# Patient Record
Sex: FEMALE | Race: BLACK OR AFRICAN AMERICAN | Employment: OTHER | URBAN - METROPOLITAN AREA
[De-identification: names, ages, dates, MRNs, and addresses within clinical notes are randomized per-mention and may not be internally consistent; named-entity substitution may affect disease eponyms.]

---

## 2017-01-13 ENCOUNTER — HOSPITAL ENCOUNTER (OUTPATIENT)
Dept: BONE DENSITY | Age: 62
Discharge: HOME | End: 2017-01-13
Attending: OBSTETRICS & GYNECOLOGY
Payer: COMMERCIAL

## 2017-01-13 ENCOUNTER — HOSPITAL ENCOUNTER (OUTPATIENT)
Dept: MAMMOGRAPHY | Age: 62
Discharge: HOME | End: 2017-01-13
Attending: OBSTETRICS & GYNECOLOGY
Payer: COMMERCIAL

## 2017-01-13 DIAGNOSIS — Z78.0 ASYMPTOMATIC POSTMENOPAUSAL STATUS (AGE-RELATED) (NATURAL): ICD-10-CM

## 2017-01-13 DIAGNOSIS — Z12.31 VISIT FOR SCREENING MAMMOGRAM: ICD-10-CM

## 2017-01-13 PROCEDURE — 77067 SCR MAMMO BI INCL CAD: CPT

## 2017-01-13 PROCEDURE — 77080 DXA BONE DENSITY AXIAL: CPT

## 2017-11-15 ENCOUNTER — HOSPITAL ENCOUNTER (OUTPATIENT)
Dept: LAB | Age: 62
Discharge: HOME | End: 2017-11-15
Payer: COMMERCIAL

## 2017-11-15 DIAGNOSIS — I10 ESSENTIAL HYPERTENSION, MALIGNANT: ICD-10-CM

## 2017-11-15 PROCEDURE — 93005 ELECTROCARDIOGRAM TRACING: CPT

## 2017-11-16 LAB
ATRIAL RATE: 64 BPM
CALCULATED P AXIS, ECG09: 14 DEGREES
CALCULATED R AXIS, ECG10: 15 DEGREES
CALCULATED T AXIS, ECG11: 55 DEGREES
DIAGNOSIS, 93000: NORMAL
P-R INTERVAL, ECG05: 134 MS
Q-T INTERVAL, ECG07: 422 MS
QRS DURATION, ECG06: 84 MS
QTC CALCULATION (BEZET), ECG08: 435 MS
VENTRICULAR RATE, ECG03: 64 BPM

## 2018-02-01 ENCOUNTER — HOSPITAL ENCOUNTER (OUTPATIENT)
Dept: PHYSICAL THERAPY | Age: 63
Discharge: HOME | End: 2018-02-01
Payer: COMMERCIAL

## 2018-02-01 PROCEDURE — 97165 OT EVAL LOW COMPLEX 30 MIN: CPT

## 2018-02-01 PROCEDURE — 97110 THERAPEUTIC EXERCISES: CPT

## 2018-02-01 NOTE — PROGRESS NOTES
Hand Therapy Evaluation and Daily Note    Patient Name: Haylee Nurse  Date:2018  : 1955  Age: 65 y.o.y/o  [x]  Patient  Verified  Payor: BLUE CROSS / Plan: [id number] PPO / Product Type: PPO /    Referring Provider: Juanita Hancock*  Next MD Visit:    Onset Date:  17  Surgical Date: N/A  Surgical Procedure: N/A    In time:5:05  Out time:6:00  Total Treatment Time (min): 55  Visit #:  12    Treatment Area: Other extraarticular fracture of lower end of right radius, subsequent encounter for closed fracture with routine healing [S52.451T]    Precautions: none, WB as tolerated    Hand Dominance: right handed   Hand Involved: right    Total Evaluation Time:  30    History of Present Condition:  Patient is a 65 y.o. female with a chief complaint of R wrist pain and weakness s/p R closed fracture of distal radius. Pt reports a fall on 17 when going to sit down and seat moved, causing her to fall and break the R wrist. Pt reports pain is worse at night. Pt presented with wrist cock up splint and reports wearing it when performing lifting tasks. Pt reports she went to [facility] ED in which she received X-rays and staff reported no fracture. Pt referred to orthopedic in which he placed pt in wrist cock up splint and did not perform X rays at that time. Pt returned to MD 2 weeks later due to c/o increased pain and swelling. MD took X-rays at that time and X-rays resulted in a nondisplaced fracture and horizontal fracture of the right distal radius. Pt reports being casted up until 3 weeks ago and referred to skilled OT services to address deficits and improve quality of life. Pain Rating:   Current: (0-no pain 10-debilitating pain) mild   At best: (0-no pain 10-debilitating pain) mild and moderate  At worst: (0-no pain 10-debilitating pain) severe  Location: right wrist  Type:  moderate   Better with:    Worse with:     Medications/Allergies/Past Medical History:  See chart; reviewed with patient. H/x breast reduction, arthritis, high BP    Diagnostic Tests: X-rays on 11/18/17 at [facility] ED, second X-rays over 30 days later w/ ortho MD.    Prior Level of Function: Able to perform daily activities without functional limitations. Current Level of Function:  Unable to use R hand for functional tasks without pain. Social History: Pt reports living w/ spouse and reports living in single story home. Occupation/Job Requirements: Not working. Observation: mild swelling, pt presented w/ wrist cock up splint   Scar/incision:   N/A  Location:  R wrist     Palpation:  Mild tenderness reported w/ palpation to R distal radius. Range of Motion:   Elbow/Wrist   Wrist Right  2/1/18 Left  2/1/18      Flex 55 90      Ext 37 80      UD 20 30      RD 5 20      FA         Pro 80 80      Sup 65 80      Elbow        Flex 150       Ext 0 0          Strength:   Measurements: Taken with Devon Dynamometer, in Lbs   Level 2 Date  2/1/18 Date Date Date Date Date Date   Right 5         Left 50         Deficit 45         Change                Pinch Measurements: Taken with Pinch Gauge, in Lbs   (hand) Date  2/1/18 Date Date Date Date Date   Lateral          Right 6        Left  17        Deficit 9        Change         Pad         Right 3        Left 10        Deficit 7        Change         Paco         Right 4        Left 11        Deficit 7        Change             Sensation:    Constant numbing and tingling volar tip of thumb    Edema: GIRTH CHART measured in cm  Date: 2/1/18 2/1/18   Side R L   DPC circum.  19.6 18.8   Wrist Crease 16.2 15.9   FA  20 21   Elbow 22.2 23.4       Special Tests:    Nine-Hole Peg Test:  Left= _____seconds  Right=_____seconds      ADLs  Feeding:        []MaxA   []ModA   []Brandon   [] CGA   []SBA   []Katy   [x]Independent  UE Dressing:       []MaxA   []ModA   [x]Brandon   [] CGA   []SBA   []Katy   []Independent  LE Dressing:       []MaxA   []ModA   [x]Brandon   [] CGA []SBA   []Katy   []Independent  Grooming:       []MaxA   []ModA   []Brandon   [] CGA   []SBA   []Katy   [x]Independent  Toileting:       []MaxA   []ModA   []Brandon   [] CGA   []SBA   []Katy   [x]Independent  Bathing:       []MaxA   []ModA   []Brandon   [] CGA   []SBA   []Katy   [x]Independent  Light Meal Prep:    []MaxA   []ModA   [x]Brandon   [] CGA   []SBA   []Katy   []Independent  Household/Other: []MaxA   []ModA   [x]Brandon   [] CGA   []SBA   []Katy   []Independent  Adaptive Equip:     []MaxA   []ModA   []Brandon   [] CGA   []SBA   []Katy   []Independent  Driving:       []MaxA   []ModA   []Brandon   [] CGA   []SBA   []Katy   [x]Independent      Todays Treatment:  Patient received an initial evaluation today followed by education as to diagnosis, precautions and treatment plan. Patient was provided with a basic home exercise program including wrist AROM, wrist stretches, tendon glides, and radial nerve glides. OBJECTIVE     25 min Therapeutic Exercise:  [x] See flow sheet :   Rationale: increase ROM and increase strength to improve the patients ability to use R UE for functional tasks. With   [x] TE   [] TA   [] neuro   [] other: Patient Education: [x] Review HEP    [] Progressed/Changed HEP based on:   [] positioning   [] body mechanics   [] transfers   [] heat/ice application   [] Splint wear/care   [] Sensory re-education   [] scar management      [] other:      Pain Level (0-10 scale) post treatment: 4/10    Patient will continue to benefit from skilled OT services to modify and progress therapeutic interventions, address ROM deficits, address strength deficits and analyze and address soft tissue restrictions to attain goals. Assessment: Pt able close hand and make a fist and is able to perform opposition of thumb to all tips and bases of fingers with effort for ring and fifth fingers. Short Term Goals: To be accomplished in 2  weeks:  1.  Patient will be compliant with initial home exercise program to take an active role in their rehabilitation process. Status at Eval: instructed and provided initial HEP. 2. Patient will demonstrate a good understanding of their condition and strategies for self-management. Status at Eval: discussed increasing use of R UE as tolerated. Long Term Goals: To be accomplished in 4 weeks:   1. Patient will have 70 degrees of right wrist extension in order to increase ease with ADL's such as bathing, eating and dressing and to eventually bear weight thru the right upper extremity as in pushing up from a chair. Status at eval: 37  2. Patient will have 75 degrees of right wrist flexion in order to perform hygiene tasks and /or work with tools such as a screwdriver. Status at eval: 55  3. Patient will have 75 degrees of right forearm supination in order to perform ADL's including washing face and accepting change. Status at Eval: 65  4. Patient will have 50 pounds of  in the right hand to allow for functional grasp for all ADL activities including dressing, bathing and self care. At Eval: 5  5. Patient will have improved right lateral pinch strength of  15 pounds in order to perform fine motor tasks such as turning pages, turning ignition and open containers. At Eval: 6  6. Patient will report no difficulty carrying a shopping bag or briefcase on FOTO outcome measure in order to demonstrate improved functional performance. Status at Eval: Unable to do    Frequency / Duration: Patient to be seen 2-3 times per week for 4 weeks:    Patient/ Caregiver education and instruction: Diagnosis, prognosis, activity modification and exercises    Functional Status Measure:  Patient's: 27  FOTO Benchmark: 53  Expected Change: 26  FOTO score based on 0 - 100 point scale, with 100 being no impairment. These scores are determined by patient reported functional assessments compared against national benchmarked data.     Carry   Current  CL= 60-79%    Goal  CK= 40-59%    The severity rating is based on clinical judgment and the FOTO score.     Les Santana OT 2/1/2018 5:08 PM

## 2018-02-01 NOTE — PROGRESS NOTES
[facility]  [address]  [place], [address]  [phone] fax    Plan of Care/Statement of Necessity for Occupational Therapy Services    Patient name: Komal Benz Start of Care: 2018   Referral source: Dom Siddiqi* : 1955    Medical Diagnosis: Other extraarticular fracture of lower end of right radius, subsequent encounter for closed fracture with routine healing [S52.551D]   Onset Date:17    Treatment Diagnosis: R wrist pain   Prior Hospitalization: see medical history Provider#: [id number]   Medications: Verified on Patient summary List    Comorbidities: arthritis, high BP   Prior Level of Function: Able to perform daily activities without functional limitations. The Plan of Care and following information is based on the information from the initial evaluation. Assessment/ key information: Patient is a 65 y.o. female with a chief complaint of R wrist pain and weakness s/p R closed fracture of distal radius. Pt reports a fall on 17 when going to sit down and seat moved, causing her to fall and break the R wrist. Pt reports pain is worse at night. Pt presented with wrist cock up splint and reports wearing it when performing lifting tasks. Pt reports she went to [facility] ED in which she received X-rays and staff reported no fracture. Pt referred to orthopedic in which he placed pt in wrist cock up splint and did not perform X rays at that time. Pt returned to MD 2 weeks later due to c/o increased pain and swelling. MD took X-rays at that time and X-rays resulted in a nondisplaced fracture and horizontal fracture of the right distal radius. Pt reports being casted up until 3 weeks ago and presented today with wrist cockup splint. Skilled OT services are necessary to address deficits and improve quality of life.     Evaluation Complexity: History LOW Complexity : Brief history review  Examination LOW Complexity : 1-3 performance deficits relating to physical, cognitive , or psychosocial skils that result in activity limitations and / or participation restrictions  Clinical Decision Making LOW Complexity : No comorbidities that affect functional and no verbal or physical assistance needed to complete eval tasks   Overall Complexity Rating: LOW     Problem List: Pain effecting function, Decreased range of motion, Decreased strength, Edema effecting function and Decreased flexibility/joint mobility     Treatment Plan may include any combination of the following: Therapeutic exercise, Therapeutic activities, Neuromuscular re-education, Physical agent/modality, Manual therapy and Patient education    Patient / Family readiness to learn indicated by: asking questions and interest    Persons(s) to be included in education:   patient (P)    Barriers to Learning/Limitations: None    Patient Goal (s): To restore full use of hand and wrist.    Patient Self Reported Health Status: good    Rehabilitation Potential: good    Short Term Goals: To be accomplished in 2  weeks:  1. Patient will be compliant with initial home exercise program to take an active role in their rehabilitation process. Status at Eval: instructed and provided initial HEP. 2. Patient will demonstrate a good understanding of their condition and strategies for self-management. Status at Eval: discussed increasing use of R UE as tolerated.     Long Term Goals: To be accomplished in 4 weeks:                        1. Patient will have 70 degrees of right wrist extension in order to increase ease with ADL's such as bathing, eating and dressing and to eventually bear weight thru the right upper extremity as in pushing up from a chair. Status at eval: 37  2. Patient will have 75 degrees of right wrist flexion in order to perform hygiene tasks and /or work with tools such as a screwdriver. Status at eval: 55  3.  Patient will have 75 degrees of right forearm supination in order to perform ADL's including washing face and accepting change. Status at Eval: 65  4. Patient will have 50 pounds of  in the right hand to allow for functional grasp for all ADL activities including dressing, bathing and self care. At Eval: 5  5. Patient will have improved right lateral pinch strength of  15 pounds in order to perform fine motor tasks such as turning pages, turning ignition and open containers. At Eval: 6  6. Patient will report no difficulty carrying a shopping bag or briefcase on FOTO outcome measure in order to demonstrate improved functional performance. Status at Eval: Unable to do    Frequency / Duration: Patient to be seen 2-3 times per week for 4 weeks:     Patient/ Caregiver education and instruction: Diagnosis, prognosis, activity modification and exercises     Functional Status Measure:  Patient's: 27  FOTO Benchmark: 53  Expected Change: 26  FOTO score based on 0 - 100 point scale, with 100 being no impairment. These scores are determined by patient reported functional assessments compared against national benchmarked data.     Carry   Current  CL= 60-79%    Goal  CK= 40-59%     The severity rating is based on clinical judgment and the FOTO score. [x]  Plan of care has been reviewed with Bhargav Real OT 2/1/2018 5:08 PM  ________________________________________________________________________    I certify that the above Therapy Services are being furnished while the patient is under my care. I agree with the treatment plan and certify that this therapy is necessary.     Physician's Signature:____________________  Date:____________Time:__________    Please sign and return to [facility] [address]  [place], [address]  [phone] fax

## 2018-02-13 ENCOUNTER — APPOINTMENT (OUTPATIENT)
Dept: PHYSICAL THERAPY | Age: 63
End: 2018-02-13
Payer: COMMERCIAL

## 2018-02-15 ENCOUNTER — HOSPITAL ENCOUNTER (OUTPATIENT)
Dept: PHYSICAL THERAPY | Age: 63
Discharge: HOME | End: 2018-02-15
Payer: COMMERCIAL

## 2018-02-15 PROCEDURE — 97110 THERAPEUTIC EXERCISES: CPT

## 2018-02-15 PROCEDURE — 97035 APP MDLTY 1+ULTRASOUND EA 15: CPT

## 2018-02-15 NOTE — PROGRESS NOTES
OT DAILY TREATMENT NOTE - non [facility] 3-16    Patient Name: Haylee Nurse  Date:2/15/2018  : 1955  [x]  Patient  Verified  Payor: BLUE CROSS / Plan: [facility] PPO / Product Type: PPO /    In time: 5:30  Out time: 6:  Total Treatment Time (min): 45  Visit #: 2 of 12    Treatment Area: Other extraarticular fracture of lower end of right radius, subsequent encounter for closed fracture with routine healing [N09.489H]    SUBJECTIVE  Pain Level (0-10 scale): 6/10  Any medication changes, allergies to medications, adverse drug reactions, diagnosis change, or new procedure performed?: [x] No    [] Yes (see summary sheet for update)  Subjective functional status/changes:   [] No changes reported  \"I'm beginning to be able to use my hand more. \"    OBJECTIVE  Modality rationale: decrease edema, decrease pain and increase tissue extensibility to improve the patients ability to increase use of R hand to complete functional tasks without pain.    Min Type Additional Details    [] Estim:  []Unatt       []IFC  []Premod                        []Other:  []w/ice   []w/heat  Position:  Location:    [] Estim: []Att    []TENS instruct  []NMES                    []Other:  []w/US   []w/ice   []w/heat  Position:  Location:    []  Traction: [] Cervical       []Lumbar                       [] Prone          []Supine                       []Intermittent   []Continuous Lbs:  [] before manual  [] after manual   10 [x]  Ultrasound: [x]Continuous   [] Pulsed                           []1MHz   [x]3MHz Location: R dorsal hand  W/cm2: 1.0    []  Iontophoresis with dexamethasone         Location: [] Take home patch   [] In clinic   10 []  Ice     [x]  heat  []  Ice massage  []  Laser   []  Anodyne Position:   Location: R hand    []  Laser with stim  []  Other: Position:  Location:    []  Vasopneumatic Device Pressure:       [] lo [] med [] hi   Temperature: [] lo [] med [] hi   [x] Skin assessment post-treatment: [x]intact []redness- no adverse reaction    []redness - adverse reaction:   25 min Therapeutic Exercise:  [] See flow sheet :   Rationale: increase ROM and increase strength to improve the patients ability to use R hand for lifting/carrying items. With   [x] TE   [] TA   [] neuro   [] other: Patient Education: [x] Review HEP    [] Progressed/Changed HEP based on:   [] positioning   [] body mechanics   [] transfers   [] heat/ice application   [] Splint wear/care   [] Sensory re-education   [] scar management      [] other:             Other Objective/Functional Measures: Range of Motion:   Elbow/Wrist   Wrist Right  2/1/18 Left  2/1/18         Flex 55 90         Ext 37 80         UD 20 30         RD 5 20         FA              Pro 80 80         Sup 65 80         Elbow             Flex 150           Ext 0 0               Strength:   Measurements: Taken with Devon Dynamometer, in Lbs   Level 2 Date  2/1/18 Date Date Date Date Date Date   Right 5               Left 50               Deficit 45               Change                      Pinch Measurements: Taken with Pinch Gauge, in Lbs   (hand) Date  2/1/18 Date Date Date Date Date   Lateral                Right 6             Left  17             Deficit 9             Change               Pad               Right 3             Left 10             Deficit 7             Change               Paco               Right 4             Left 11             Deficit 7             Change                     Sensation:    Constant numbing and tingling volar tip of thumb     Edema: GIRTH CHART measured in cm  Date: 2/1/18 2/1/18   Side R L   DPC circum. 19.6 18.8   Wrist Crease 16.2 15.9   FA  20 21   Elbow 22.2 23.4        Pain Level (0-10 scale) post treatment: 5/10    ASSESSMENT/Changes in Function: Pt challenged today with therapeutic exercises. Pt reported decreased pain after US. Pt has met STGs.     Patient will continue to benefit from skilled OT services to modify and progress therapeutic interventions, address ROM deficits and address strength deficits to attain remaining goals. [x]  See Plan of Care  []  See progress note/recertification  []  See Discharge Summary         Progress towards goals / Updated goals:  Short Term Goals: To be accomplished in 2  weeks:  1. Patient will be compliant with initial home exercise program to take an active role in their rehabilitation process. Goal Met 2/15/18  Status at Eval: instructed and provided initial HEP. 2. Patient will demonstrate a good understanding of their condition and strategies for self-management. Goal Met 2/15/18  Status at Eval: discussed increasing use of R UE as tolerated.     Long Term Goals: To be accomplished in 4 weeks:                        1. Patient will have 70 degrees of right wrist extension in order to increase ease with ADL's such as bathing, eating and dressing and to eventually bear weight thru the right upper extremity as in pushing up from a chair. Status at eval: 37  2. Patient will have 75 degrees of right wrist flexion in order to perform hygiene tasks and /or work with tools such as a screwdriver. Status at eval: 55  3. Patient will have 75 degrees of right forearm supination in order to perform ADL's including washing face and accepting change. Status at Eval: 65  4. Patient will have 50 pounds of  in the right hand to allow for functional grasp for all ADL activities including dressing, bathing and self care. At Eval: 5  5. Patient will have improved right lateral pinch strength of  15 pounds in order to perform fine motor tasks such as turning pages, turning ignition and open containers. At Eval: 6  6. Patient will report no difficulty carrying a shopping bag or briefcase on FOTO outcome measure in order to demonstrate improved functional performance.   Status at Eval: Unable to do    PLAN  []  Upgrade activities as tolerated     [x]  Continue plan of care  []  Update interventions per flow sheet       []  Discharge due to:_  []  Other:_      Birthkaren Leigh, OT 2/15/2018  5:51 PM    Future Appointments  Date Time Provider Bandar Jacobsen   2/16/2018 5:00 PM Aniceto Leigh, OT [facility]   2/21/2018 5:00 PM Brisa Covarrubias [facility]   2/22/2018 6:00 PM Tamara Johnston, OT [facility]   2/27/2018 5:00 PM Birthkaren Leigh, OT [facility]   3/1/2018 5:00 PM Birthkaren Leigh, OT [facility]   3/6/2018 6:00 PM Birtha Reese, OT [facility]

## 2018-02-16 ENCOUNTER — HOSPITAL ENCOUNTER (OUTPATIENT)
Dept: PHYSICAL THERAPY | Age: 63
Discharge: HOME | End: 2018-02-16
Payer: COMMERCIAL

## 2018-02-16 PROCEDURE — 97110 THERAPEUTIC EXERCISES: CPT

## 2018-02-16 PROCEDURE — 97035 APP MDLTY 1+ULTRASOUND EA 15: CPT

## 2018-02-16 NOTE — PROGRESS NOTES
OT DAILY TREATMENT NOTE - non [facility] 3-16    Patient Name: Bobbi Merino  Date:2018  : 1955  [x]  Patient  Verified  Payor: Stacey Evans / Plan: Tomeka Mtz [id number] PPO / Product Type: PPO /    In time: 5:00  Out time: 5:35  Total Treatment Time (min): 35  Visit #: 3 of 12    Treatment Area: Other extraarticular fracture of lower end of right radius, subsequent encounter for closed fracture with routine healing [I37.867C]    SUBJECTIVE  Pain Level (0-10 scale): 6/10  Any medication changes, allergies to medications, adverse drug reactions, diagnosis change, or new procedure performed?: [x] No    [] Yes (see summary sheet for update)  Subjective functional status/changes:   [] No changes reported  \"I've been in pain most of the day today. \"    OBJECTIVE  Modality rationale: decrease pain and increase tissue extensibility to improve the patients ability to use R hand for functional tasks.    Min Type Additional Details    [] Estim:  []Unatt       []IFC  []Premod                        []Other:  []w/ice   []w/heat  Position:  Location:    [] Estim: []Att    []TENS instruct  []NMES                    []Other:  []w/US   []w/ice   []w/heat  Position:  Location:    []  Traction: [] Cervical       []Lumbar                       [] Prone          []Supine                       []Intermittent   []Continuous Lbs:  [] before manual  [] after manual   10 [x]  Ultrasound: [x]Continuous   [] Pulsed                           []1MHz   [x]3MHz Location: R dorsal wrist  W/cm2: 1.0    []  Iontophoresis with dexamethasone         Location: [] Take home patch   [] In clinic   10 []  Ice     [x]  heat  []  Ice massage  []  Laser   []  Anodyne Position:  Location: R hand    []  Laser with stim  []  Other: Position:  Location:    []  Vasopneumatic Device Pressure:       [] lo [] med [] hi   Temperature: [] lo [] med [] hi   [x] Skin assessment post-treatment:  [x]intact []redness- no adverse reaction    []redness - adverse reaction:     15 min Therapeutic Exercise:  [x] See flow sheet :   Rationale: increase ROM to improve the patients ability to move R wrist thru pain-free ROM. With   [] TE   [] TA   [] neuro   [] other: Patient Education: [x] Review HEP    [] Progressed/Changed HEP based on:   [] positioning   [] body mechanics   [] transfers   [] heat/ice application   [] Splint wear/care   [] Sensory re-education   [] scar management      [] other:             Other Objective/Functional Measures: Range of Motion:   Elbow/Wrist   Wrist Right  2/1/18 Left  2/1/18            Flex 55 90            Ext 37 80            UD 20 30            RD 5 20            FA                   Pro 80 80            Sup 65 80            Elbow                  Flex 150               Ext 0 0                    Strength:   Measurements: Taken with Devon Dynamometer, in Lbs   Level 2 Date  2/1/18 Date Date Date Date Date Date   Right 5                     Left 50                     Deficit 45                     Change                             Pinch Measurements: Taken with Pinch Gauge, in Lbs   (hand) Date  2/1/18 Date Date Date Date Date   Lateral                      Right 6                  Left  17                  Deficit 9                  Change                     Pad                     Right 3                  Left 10                  Deficit 7                  Change                     Paco                     Right 4                  Left 11                  Deficit 7                  Change                             Sensation:    Constant numbing and tingling volar tip of thumb      Edema: GIRTH CHART measured in cm  Date: 2/1/18 2/1/18   Side R L   DPC circum. 19.6 18.8   Wrist Crease 16.2 15.9   FA  20 21   Elbow 22.2 23.4     Pain Level (0-10 scale) post treatment: 6/10    ASSESSMENT/Changes in Function: Pt tolerated gentle strengthening ex's after MHP.  Instructed pt to increase use of R hand/wrist within pain tolerance. Patient will continue to benefit from skilled OT services to modify and progress therapeutic interventions, address ROM deficits, address strength deficits and analyze and address soft tissue restrictions to attain remaining goals. [x]  See Plan of Care  []  See progress note/recertification  []  See Discharge Summary         Progress towards goals / Updated goals:  Short Term Goals: To be accomplished in 2  weeks:  1. Patient will be compliant with initial home exercise program to take an active role in their rehabilitation process. Goal Met 2/15/18  Status at Eval: instructed and provided initial HEP. 2. Patient will demonstrate a good understanding of their condition and strategies for self-management. Goal Met 2/15/18  Status at Eval: discussed increasing use of R UE as tolerated.      Long Term Goals: To be accomplished in 4 weeks:                        1. Patient will have 70 degrees of right wrist extension in order to increase ease with ADL's such as bathing, eating and dressing and to eventually bear weight thru the right upper extremity as in pushing up from a chair. Status at eval: 37  2.  Patient will have 75 degrees of right wrist flexion in order to perform hygiene tasks and /or work with tools such as a screwdriver. Status at eval: 55  3. Patient will have 75 degrees of right forearm supination in order to perform ADL's including washing face and accepting change. Status at Eval: 65  4. Patient will have 50 pounds of  in the right hand to allow for functional grasp for all ADL activities including dressing, bathing and self care. At Eval: 5  5. Patient will have improved right lateral pinch strength of  15 pounds in order to perform fine motor tasks such as turning pages, turning ignition and open containers. At Eval: 6  6.  Patient will report no difficulty carrying a shopping bag or briefcase on FOTO outcome measure in order to demonstrate improved functional performance.   Status at Eval: Unable to do    PLAN  []  Upgrade activities as tolerated     [x]  Continue plan of care  []  Update interventions per flow sheet       []  Discharge due to:_  []  Other:_      Otilia Adam OT 2/16/2018  5:08 PM    Future Appointments  Date Time Provider Bandar Jacobsen   2/21/2018 5:00 PM Miah Floyd [facility]   2/22/2018 6:00 PM Alicia Albarado OT [facility]   2/28/2018 2:30 PM Otilia Adam OT [facility]   3/1/2018 5:00 PM Otilia Adam OT [facility]   3/7/2018 1:00 PM Alicia Albarado OT [facility]

## 2018-02-21 ENCOUNTER — HOSPITAL ENCOUNTER (OUTPATIENT)
Dept: PHYSICAL THERAPY | Age: 63
Discharge: HOME | End: 2018-02-21
Payer: COMMERCIAL

## 2018-02-21 PROCEDURE — 97110 THERAPEUTIC EXERCISES: CPT

## 2018-02-21 PROCEDURE — 97035 APP MDLTY 1+ULTRASOUND EA 15: CPT

## 2018-02-21 NOTE — PROGRESS NOTES
OT DAILY TREATMENT NOTE - non [facility] 3-16    Patient Name: Brendan Platt  Date:2018  : 1955  [x]  Patient  Verified  Payor: BLUE CROSS / Plan: [facility] PPO / Product Type: PPO /    In time:500  Out time:537  Total Treatment Time (min): 37  Visit #: 4 of 12    Treatment Area:  Other extraarticular fracture of lower end of right radius, subsequent encounter for closed fracture with routine healing [F32.270K]    SUBJECTIVE  Pain Level (0-10 scale): 5/10  Any medication changes, allergies to medications, adverse drug reactions, diagnosis change, or new procedure performed?: [x] No    [] Yes (see summary sheet for update)  Subjective functional status/changes:   [] No changes reported  Pt reports decreased sensation in R Thumb    OBJECTIVE  Modality rationale: decrease inflammation, decrease pain and increase tissue extensibility to improve the patients ability to , pinch   Min Type Additional Details    [] Estim:  []Unatt       []IFC  []Premod                        []Other:  []w/ice   []w/heat  Position:  Location:    [] Estim: []Att    []TENS instruct  []NMES                    []Other:  []w/US   []w/ice   []w/heat  Position:  Location:    []  Traction: [] Cervical       []Lumbar                       [] Prone          []Supine                       []Intermittent   []Continuous Lbs:  [] before manual  [] after manual   10 [x]  Ultrasound: []Continuous   [x] Pulsed                           []1MHz   []3MHz Location: R Wrist  W/cm2: 1.0    []  Iontophoresis with dexamethasone         Location: [] Take home patch   [] In clinic   10 []  Ice     [x]  heat  []  Ice massage  []  Laser   []  Anodyne Position:  Location: R Wrist    []  Laser with stim  []  Other: Position:  Location:    []  Vasopneumatic Device Pressure:       [] lo [] med [] hi   Temperature: [] lo [] med [] hi   [x] Skin assessment post-treatment:  [x]intact []redness- no adverse reaction    []redness - adverse reaction:     17 min Therapeutic Exercise:  [] See flow sheet :   Rationale: increase ROM and increase strength to improve the patients ability to functionally use R Hand    Ball Stretches  Wrist Maze        With   [] TE   [] TA   [] neuro   [] other: Patient Education: [x] Review HEP    [] Progressed/Changed HEP based on:   [] positioning   [] body mechanics   [] transfers   [] heat/ice application   [] Splint wear/care   [] Sensory re-education   [] scar management      [] other:             Other Objective/Functional Measures:   Range of Motion:   Elbow/Wrist   Wrist Right  2/1/18 Left  2/1/18            Flex 55 90            Ext 37 80            UD 20 30            RD 5 20            FA                   Pro 80 80            Sup 65 80            Elbow                  Flex 150               Ext 0 0                    Strength:   Measurements: Taken with Devon Dynamometer, in Lbs   Level 2 Date  2/1/18 Date Date Date Date Date Date   Right 5                     Left 50                     Deficit 45                     Change                             Pinch Measurements: Taken with Pinch Gauge, in Lbs   (hand) Date  2/1/18 Date Date Date Date Date   Lateral                      Right 6                  Left  17                  Deficit 9                  Change                     Pad                     Right 3                  Left 10                  Deficit 7                  Change                     Paco                     Right 4                  Left 11                  Deficit 7                  Change                             Sensation:    Constant numbing and tingling volar tip of thumb      Edema: GIRTH CHART measured in cm  Date: 2/1/18 2/1/18   Side R L   DPC circum.  19.6 18.8   Wrist Crease 16.2 15.9   FA  20 21   Elbow 22.2 23.4          Pain Level (0-10 scale) post treatment: 2/10    ASSESSMENT/Changes in Function: Decreased pain with modalities    Patient will continue to benefit from skilled OT services to modify and progress therapeutic interventions, address ROM deficits, address strength deficits, analyze and address soft tissue restrictions and instruct in home and community integration to attain remaining goals. []  See Plan of Care  []  See progress note/recertification  []  See Discharge Summary         Progress towards goals / Updated goals:  Short Term Goals: To be accomplished in 2  weeks:  1. Patient will be compliant with initial home exercise program to take an active role in their rehabilitation process. Goal Met 2/15/18  Status at Eval: instructed and provided initial HEP. 2. Patient will demonstrate a good understanding of their condition and strategies for self-management. Goal Met 2/15/18  Status at Eval: discussed increasing use of R UE as tolerated.      Long Term Goals: To be accomplished in 4 weeks:                        1. Patient will have 70 degrees of right wrist extension in order to increase ease with ADL's such as bathing, eating and dressing and to eventually bear weight thru the right upper extremity as in pushing up from a chair. Status at eval: 37  2.  Patient will have 75 degrees of right wrist flexion in order to perform hygiene tasks and /or work with tools such as a screwdriver. Status at eval: 55  3. Patient will have 75 degrees of right forearm supination in order to perform ADL's including washing face and accepting change. Status at Eval: 65  4. Patient will have 50 pounds of  in the right hand to allow for functional grasp for all ADL activities including dressing, bathing and self care. At Eval: 5  5. Patient will have improved right lateral pinch strength of  15 pounds in order to perform fine motor tasks such as turning pages, turning ignition and open containers. At Eval: 6  6.  Patient will report no difficulty carrying a shopping bag or briefcase on FOTO outcome measure in order to demonstrate improved functional performance.   Status at Eval: Unable to do    PLAN  []  Upgrade activities as tolerated     [x]  Continue plan of care  []  Update interventions per flow sheet       []  Discharge due to:_  []  Other:_      Yogi Casey, ANTHONY/L 2/21/2018  5:35 PM    Future Appointments  Date Time Provider Bandar Jacobsen   2/22/2018 6:00 PM Zunilda Sanches, OT [facility]   2/28/2018 2:30 PM Chin Plata, OT [facility]   3/1/2018 5:00 PM Chin Plata, OT [facility]   3/7/2018 1:00 PM Keri Siemens, OT [facility]

## 2018-02-22 ENCOUNTER — APPOINTMENT (OUTPATIENT)
Dept: PHYSICAL THERAPY | Age: 63
End: 2018-02-22
Payer: COMMERCIAL

## 2018-02-27 ENCOUNTER — APPOINTMENT (OUTPATIENT)
Dept: PHYSICAL THERAPY | Age: 63
End: 2018-02-27
Payer: COMMERCIAL

## 2018-02-28 ENCOUNTER — HOSPITAL ENCOUNTER (OUTPATIENT)
Dept: PHYSICAL THERAPY | Age: 63
Discharge: HOME | End: 2018-02-28
Payer: COMMERCIAL

## 2018-02-28 PROCEDURE — 97035 APP MDLTY 1+ULTRASOUND EA 15: CPT

## 2018-02-28 PROCEDURE — 97110 THERAPEUTIC EXERCISES: CPT

## 2018-02-28 NOTE — PROGRESS NOTES
OT DAILY TREATMENT NOTE - non [facility] 3-16    Patient Name: Delfina Olsen  Date:2018  : 1955  [x]  Patient  Verified  Payor: BLUE CROSS / Plan: [facility] PPO / Product Type: PPO /    In time: 2:30  Out time: 3:10  Total Treatment Time (min): 40  Visit #: 5 of 12    Treatment Area: Other extraarticular fracture of lower end of right radius, subsequent encounter for closed fracture with routine healing [U20.953H]    SUBJECTIVE  Pain Level (0-10 scale): 4/10  Any medication changes, allergies to medications, adverse drug reactions, diagnosis change, or new procedure performed?: [x] No    [] Yes (see summary sheet for update)  Subjective functional status/changes:   [] No changes reported  \"I am feeling a little bit better I think. \"    OBJECTIVE  Modality rationale: decrease edema, decrease pain and increase tissue extensibility to improve the patients ability to use R hand for functional tasks.    Min Type Additional Details    [] Estim:  []Unatt       []IFC  []Premod                        []Other:  []w/ice   []w/heat  Position:  Location:    [] Estim: []Att    []TENS instruct  []NMES                    []Other:  []w/US   []w/ice   []w/heat  Position:  Location:    []  Traction: [] Cervical       []Lumbar                       [] Prone          []Supine                       []Intermittent   []Continuous Lbs:  [] before manual  [] after manual   10 [x]  Ultrasound: []Continuous   [x] Pulsed 50%                           []1MHz   [x]3MHz Location: dorsum R hand, dorsum R wrist  W/cm2: 1.0    []  Iontophoresis with dexamethasone         Location: [] Take home patch   [] In clinic   10 []  Ice     [x]  heat  []  Ice massage  []  Laser   []  Anodyne Position:  Location: R hand    []  Laser with stim  []  Other: Position:  Location:    []  Vasopneumatic Device Pressure:       [] lo [] med [] hi   Temperature: [] lo [] med [] hi   [x] Skin assessment post-treatment:  [x]intact []redness- no adverse reaction    []redness - adverse reaction:     20 min Therapeutic Exercise:  [x] See flow sheet :   Rationale: increase ROM and increase strength to improve the patients ability to increase use of R hand for functional tasks. With   [x] TE   [] TA   [] neuro   [] other: Patient Education: [x] Review HEP    [] Progressed/Changed HEP based on:   [] positioning   [] body mechanics   [] transfers   [] heat/ice application   [] Splint wear/care   [] Sensory re-education   [] scar management      [] other:             Other Objective/Functional Measures: Range of Motion:   Elbow/Wrist   Wrist Right  2/1/18 Left  2/1/18            Flex 55 90            Ext 37 80            UD 20 30            RD 5 20            FA                   Pro 80 80            Sup 65 80            Elbow                  Flex 150               Ext 0 0                    Strength:   Measurements: Taken with Devon Dynamometer, in Lbs   Level 2 Date  2/1/18 Date Date Date Date Date Date   Right 5                     Left 50                     Deficit 45                     Change                             Pinch Measurements: Taken with Pinch Gauge, in Lbs   (hand) Date  2/1/18 Date Date Date Date Date   Lateral                      Right 6                  Left  17                  Deficit 9                  Change                     Pad                     Right 3                  Left 10                  Deficit 7                  Change                     Paco                     Right 4                  Left 11                  Deficit 7                  Change                             Sensation:    Constant numbing and tingling volar tip of thumb      Edema: GIRTH CHART measured in cm  Date: 2/1/18 2/1/18   Side R L   DPC circum.  19.6 18.8   Wrist Crease 16.2 15.9   FA  20 21   Elbow 22.2 23.4        Pain Level (0-10 scale) post treatment: 4/10    ASSESSMENT/Changes in Function: Pt reported radiating pain into elbow after T-bar ex's, ceased activity and began US. Pt responded well to US. Patient will continue to benefit from skilled OT services to modify and progress therapeutic interventions, address ROM deficits, address strength deficits and analyze and address soft tissue restrictions to attain remaining goals. [x]  See Plan of Care  []  See progress note/recertification  []  See Discharge Summary         Progress towards goals / Updated goals:  Short Term Goals: To be accomplished in 2  weeks:  1. Patient will be compliant with initial home exercise program to take an active role in their rehabilitation process. Goal Met 2/15/18  Status at Eval: instructed and provided initial HEP. 2. Patient will demonstrate a good understanding of their condition and strategies for self-management. Goal Met 2/15/18  Status at Eval: discussed increasing use of R UE as tolerated.      Long Term Goals: To be accomplished in 4 weeks:                        1. Patient will have 70 degrees of right wrist extension in order to increase ease with ADL's such as bathing, eating and dressing and to eventually bear weight thru the right upper extremity as in pushing up from a chair. Status at eval: 37  2.  Patient will have 75 degrees of right wrist flexion in order to perform hygiene tasks and /or work with tools such as a screwdriver. Status at eval: 55  3. Patient will have 75 degrees of right forearm supination in order to perform ADL's including washing face and accepting change. Status at Eval: 65  4. Patient will have 50 pounds of  in the right hand to allow for functional grasp for all ADL activities including dressing, bathing and self care. At Eval: 5  5. Patient will have improved right lateral pinch strength of  15 pounds in order to perform fine motor tasks such as turning pages, turning ignition and open containers. At Eval: 6  6.  Patient will report no difficulty carrying a shopping bag or briefcase on FOTO outcome measure in order to demonstrate improved functional performance.   Status at Eval: Unable to do    PLAN  []  Upgrade activities as tolerated     [x]  Continue plan of care  []  Update interventions per flow sheet       []  Discharge due to:_  []  Other:_      Panchito Antonio OT 2/28/2018  2:29 PM    Future Appointments  Date Time Provider Bandar Jacobsen   2/28/2018 2:30 PM Panchito Antonio OT [facility]   3/1/2018 5:00 PM [facility]   3/7/2018 1:00 PM [facility]

## 2018-03-06 ENCOUNTER — APPOINTMENT (OUTPATIENT)
Dept: PHYSICAL THERAPY | Age: 63
End: 2018-03-06
Payer: COMMERCIAL

## 2018-03-07 ENCOUNTER — APPOINTMENT (OUTPATIENT)
Dept: PHYSICAL THERAPY | Age: 63
End: 2018-03-07
Payer: COMMERCIAL

## 2018-03-09 ENCOUNTER — HOSPITAL ENCOUNTER (OUTPATIENT)
Dept: PHYSICAL THERAPY | Age: 63
Discharge: HOME | End: 2018-03-09
Payer: COMMERCIAL

## 2018-03-09 PROCEDURE — 97110 THERAPEUTIC EXERCISES: CPT

## 2018-03-09 PROCEDURE — 97035 APP MDLTY 1+ULTRASOUND EA 15: CPT

## 2018-03-09 NOTE — PROGRESS NOTES
[facility]  [address] [facility], [facility] [address]  [phone] fax    Occupational Therapy Progress Note  Patient name: Juanita Oppenheim Start of Care:18   Referral source: Bo Simms* : 1955   Medical/Treatment Diagnosis: Other extraarticular fracture of lower end of right radius, subsequent encounter for closed fracture with routine healing [S52.609B] Onset Date:17     Prior Hospitalization: see medical history Provider#: [id number]   Medications: Verified on Patient Summary List    Comorbidities: arthritis, high BP   Prior Level of Function: Able to perform daily activities without functional limitations. Visits from Start of Care: 6   Missed Visits: 0    Established Goals:         Excellent           Good         Limited           None  [x] Increased ROM   []  []  [x]  []  [x] Increased Strength  []  []  [x]  []  [] Increased Mobility  []  []  []  []   [x] Decreased Pain   []  []  [x]  []  [] Decreased Swelling  []  []  []  []  [] Increased Fine Motor Skills []  []  []  []  [] Increased ADL [place] []  []  []  []    Key Functional Changes: Range of Motion:   Elbow/Wrist   Wrist Right  18 Left  2/1/18     3/9/18          Flex 55 90 54           Ext 37 80   54         UD 20 30   28         RD 5 20   15         FA                   Pro 80 80            Sup 65 80            Elbow                  Flex 150               Ext 0 0                    Strength:   Measurements: Taken with Devon Dynamometer, in Lbs   Level 2 Date  18 Date  3/9/18 Date Date Date Date Date   Right 5   10                  Left 50   37                  Deficit 45                     [place]                 Pinch Measurements: Taken with Pinch Gauge, in Lbs   (hand) Date  18 Date  3/9/18 Date Date Date Date   Lateral                      Right 6   7               Left  17   16               Deficit 9                  Change                     Pad                     Right 3   4               Left 10   10               Deficit 7                  Change                     Paco                     Right 4   5               Left 11   10               Deficit 7                  Change                             Sensation:    Constant numbing and tingling volar tip of thumb      Edema: GIRTH CHART measured in cm  Date: 2/1/18 2/1/18   Side R L   DPC circum. 19.6 18.8   Wrist Crease 16.2 15.9   FA  20 21   Elbow 22.2 23.4        Updated Goals: to be achieved in 4 weeks:  1. Patient will have 70 degrees of right wrist extension in order to increase ease with ADL's such as bathing, eating and dressing and to eventually bear weight thru the right upper extremity as in pushing up from a chair. Status at eval: 37  54- 3/9/18  2.  Patient will have 75 degrees of right wrist flexion in order to perform hygiene tasks and /or work with tools such as a screwdriver. Status at eval: 55  54-3/9/18  3. Patient will have 50 pounds of  in the right hand to allow for functional grasp for all ADL activities including dressing, bathing and self care. At Eval: 5  10# 3/8/18  4. Patient will have improved right lateral pinch strength of  15 pounds in order to perform fine motor tasks such as turning pages, turning ignition and open containers. At Eval: 6  7# 3/8/18  5. Patient will report no difficulty carrying a shopping bag or briefcase on FOTO outcome measure in order to demonstrate improved functional performance.   Status at Eval: Unable to do*    ASSESSMENT/RECOMMENDATIONS:  [x]Continue therapy per initial plan/protocol at a frequency of 2 x per week for 4 weeks  []Continue therapy with the following recommended changes:_____________________      _____________________________________________________________________  []Discontinue therapy progressing towards or have reached established goals  []Discontinue therapy due to lack of appreciable progress towards goals  []Discontinue therapy due to lack of attendance or compliance  []Await Physician's recommendations/decisions regarding therapy  []Other:________________________________________________________________    Thank you for this referral.   Radha Ware OT 3/9/2018 10:14 AM  NOTE TO PHYSICIAN:  [address] TO [facility]: [phone]  If you are unable to process this request in 24 hours please contact our office: [phone]    ? I have read the above report and request that my patient continue as recommended. ? I have read the above report and request that my patient continue therapy with the following changes/special instructions:__________________________________________________________  ? I have read the above report and request that my patient be discharged from therapy.     Physicians signature: ____________________________Date: ______Time:________

## 2018-03-09 NOTE — PROGRESS NOTES
OT DAILY TREATMENT NOTE - [facility]     Patient Name: Sina Castillo  Date:3/9/2018  : 1955  [x]  Patient  Verified  Payor: BLUE CROSS / Plan: Tomeka Mtz [id number] PPO / Product Type: PPO /    In time:810  Out time:910  Total Treatment Time (min): 60  Visit #: 6 of 8  Treatment Area: Other extraarticular fracture of lower end of right radius, subsequent encounter for closed fracture with routine healing [J06.623P]    SUBJECTIVE  Pain Level (0-10 scale): 4/10  Any medication changes, allergies to medications, adverse drug reactions, diagnosis change, or new procedure performed?: [x] No    [] Yes (see summary sheet for update)  Subjective functional status/changes:   [] No changes reported  \"My pain is a constant aching down the middle of the back of my hand. \"    OBJECTIVE    Modality rationale: decrease pain and increase tissue extensibility to improve the patients ability to move and use the right dominant UE.    Min Type Additional Details    [] Estim:  []Unatt       []IFC  []Premod                        []Other:  []w/ice   []w/heat  Position:  Location:    [] Estim: []Att    []TENS instruct  []NMES                    []Other:  []w/US   []w/ice   []w/heat  Position:  Location:    []  Traction: [] Cervical       []Lumbar                       [] Prone          []Supine                       []Intermittent   []Continuous Lbs:  [] before manual  [] after manual   10 [x]  Ultrasound: []Continuous   [x] Pulsed                           []1MHz   [x]3MHz W/cm2:1.0  Location: right wrist dorsal and volar    []  Iontophoresis with dexamethasone         Location: [] Take home patch   [] In clinic   15 []  Ice     [x]  heat  []  Ice massage  []  Laser   []  Anodyne Position:resting  Location: right hand    []  Laser with stim  []  Other:  Position:  Location:    []  Vasopneumatic Device Pressure:       [] lo [] med [] hi   Temperature: [] lo [] med [] hi   [x] Skin assessment post-treatment:  [x]intact [x]redness- no adverse reaction    []redness - adverse reaction:     35 min Therapeutic Exercise:  [] See flow sheet :   Rationale: increase ROM and increase strength to improve the patients ability to return to functional use of the right wrist.      With   [] TE   [] TA   [] neuro   [] other: Patient Education: [x] Review HEP    [] Progressed/Changed HEP based on:   [] positioning   [] body mechanics   [] transfers   [] heat/ice application   [] Splint wear/care   [] Sensory re-education   [] scar management      [] other:            Other Objective/Functional Measures: Range of Motion:   Elbow/Wrist   Wrist Right  2/1/18 Left  2/1/18    3/9/18          Flex 55 90 54           Ext 37 80   54         UD 20 30   28         RD 5 20   15         FA                   Pro 80 80            Sup 65 80            Elbow                  Flex 150               Ext 0 0                    Strength:   Measurements: Taken with Devon Dynamometer, in Lbs   Level 2 Date  2/1/18 Date  3/9/18 Date Date Date Date Date   Right 5   10                  Left 50   37                  Deficit 45                     Change    [place]                 Pinch Measurements: Taken with Pinch Gauge, in Lbs   (hand) Date  2/1/18 Date  3/9/18 Date Date Date Date   Lateral                      Right 6   7               Left  17   16               Deficit 9                  Change                     Pad                     Right 3   4               Left 10   10               Deficit 7                  Change                     Paco                     Right 4   5               Left 11   10               Deficit 7                  Change                             Sensation:    Constant numbing and tingling volar tip of thumb      Edema: GIRTH CHART measured in cm  Date: 2/1/18 2/1/18   Side R L   DPC circum.  19.6 18.8   Wrist Crease 16.2 15.9   FA  20 21   Elbow 22.2 23.4     Pain Level (0-10 scale) post treatment: 1/10    ASSESSMENT/Changes in Function: Patient continues to be limited in AROM and functional strength and wound benefit from continued OT. Patient will continue to benefit from skilled OT services to modify and progress therapeutic interventions, address ROM deficits and analyze and address soft tissue restrictions to attain remaining goals. []  See Plan of Care  [x]  See progress note/recertification  []  See Discharge Summary         Progress towards goals / Updated goals:  Short Term Goals: To be accomplished in 2  weeks:  1. Patient will be compliant with initial home exercise program to take an active role in their rehabilitation process. Goal Met 2/15/18  Status at Eval: instructed and provided initial HEP. 2. Patient will demonstrate a good understanding of their condition and strategies for self-management. Goal Met 2/15/18  Status at Eval: discussed increasing use of R UE as tolerated.      Long Term Goals: To be accomplished in 4 weeks:                        1. Patient will have 70 degrees of right wrist extension in order to increase ease with ADL's such as bathing, eating and dressing and to eventually bear weight thru the right upper extremity as in pushing up from a chair. Status at eval: 37  54- 3/9/18  2.  Patient will have 75 degrees of right wrist flexion in order to perform hygiene tasks and /or work with tools such as a screwdriver. Status at eval: 55  54-3/9/18  3. Patient will have 75 degrees of right forearm supination in order to perform ADL's including washing face and accepting change. Status at Eval: 65  Goal met 80 degrees  4. Patient will have 50 pounds of  in the right hand to allow for functional grasp for all ADL activities including dressing, bathing and self care. At Eval: 5  10# 3/8/18  5. Patient will have improved right lateral pinch strength of  15 pounds in order to perform fine motor tasks such as turning pages, turning ignition and open containers. At Eval: 6  7# 3/8/18  6.  Patient will report no difficulty carrying a shopping bag or briefcase on FOTO outcome measure in order to demonstrate improved functional performance. Status at Eval: Unable to do    PLAN  []  Upgrade activities as tolerated     [x]  Continue plan of care  []  Update interventions per flow sheet       []  Discharge due to:_  []  Other:_      Kalpana Ruiz OT 3/9/2018  8:25 AM    No future appointments.

## 2018-03-16 ENCOUNTER — HOSPITAL ENCOUNTER (OUTPATIENT)
Dept: PHYSICAL THERAPY | Age: 63
Discharge: HOME | End: 2018-03-16
Payer: COMMERCIAL

## 2018-03-16 PROCEDURE — 97110 THERAPEUTIC EXERCISES: CPT

## 2018-03-16 PROCEDURE — 97035 APP MDLTY 1+ULTRASOUND EA 15: CPT

## 2018-03-16 NOTE — PROGRESS NOTES
OT DAILY TREATMENT NOTE - non [facility] 3-16    Patient Name: Komal Benz  Date:3/16/2018  : 1955  [x]  Patient  Verified  Payor: Eli Navarro / Plan: Tomeka Mtz [id number] PPO / Product Type: PPO /    In time: 3:30  Out time:4:22  Total Treatment Time (min): 58  Visit #: 1 of 8    Treatment Area: Other extraarticular fracture of lower end of right radius, subsequent encounter for closed fracture with routine healing [B06.233G]    SUBJECTIVE  Pain Level (0-10 scale): 4-5/10  Any medication changes, allergies to medications, adverse drug reactions, diagnosis change, or new procedure performed?: [x] No    [] Yes (see summary sheet for update)  Subjective functional status/changes:   [] No changes reported  \"I've been trying to use it more but I still have the pain. \"    OBJECTIVE  Modality rationale: decrease edema, decrease pain and increase tissue extensibility to improve the patients ability to increase use of right hand for functional tasks.    Min Type Additional Details    [] Estim:  []Unatt       []IFC  []Premod                        []Other:  []w/ice   []w/heat  Position:  Location:    [] Estim: []Att    []TENS instruct  []NMES                    []Other:  []w/US   []w/ice   []w/heat  Position:  Location:    []  Traction: [] Cervical       []Lumbar                       [] Prone          []Supine                       []Intermittent   []Continuous Lbs:  [] before manual  [] after manual   16 [x]  Ultrasound: []Continuous   [x] Pulsed 50%                           []1MHz   [x]3MHz Location: Right dorsal and volar wrist/thumb  W/cm2:1.0    []  Iontophoresis with dexamethasone         Location: [] Take home patch   [] In clinic   15 []  Ice     [x]  heat  []  Ice massage  []  Laser   []  Anodyne Position:  Location: Right hand/wrist    []  Laser with stim  []  Other: Position:  Location:    []  Vasopneumatic Device Pressure:       [] lo [] med [] hi   Temperature: [] lo [] med [] hi   [x] Skin assessment post-treatment:  [x]intact []redness- no adverse reaction    []redness - adverse reaction:   27 min Therapeutic Exercise:  [x] See flow sheet :   Rationale: increase ROM and improve coordination to improve the patients ability to move right wrist through pain-free ROM.       With   [x] TE   [] TA   [] neuro   [] other: Patient Education: [x] Review HEP    [] Progressed/Changed HEP based on:   [] positioning   [] body mechanics   [] transfers   [] heat/ice application   [] Splint wear/care   [] Sensory re-education   [] scar management      [] other:             Other Objective/Functional Measures: Range of Motion:   Elbow/Wrist   Wrist Right  2/1/18 Left  2/1/18     3/9/18          Flex 55 90 54           Ext 37 80   54         UD 20 30   28         RD 5 20   15         FA                   Pro 80 80            Sup 65 80            Elbow                  Flex 150               Ext 0 0                    Strength:   Measurements: Taken with Devon Dynamometer, in Lbs   Level 2 Date  2/1/18 Date  3/9/18 Date Date Date Date Date   Right 5   10                  Left 50   37                  Deficit 39                     Change    [place]                 Pinch Measurements: Taken with Pinch Gauge, in Lbs   (hand) Date  2/1/18 Date  3/9/18 Date Date Date Date   Lateral                      Right 6   7               Left  17   16               Deficit 9                  Change                     Pad                     Right 3   4               Left 10   10               Deficit 7                  Change                     Paco                     Right 4   5               Left 11   10               Deficit 7                  Change                             Sensation:    Constant numbing and tingling volar tip of thumb     Pain Level (0-10 scale) post treatment: 1/10    ASSESSMENT/Changes in Function: Pt continues to c/o thumb numbness and tingling which creates pain and adheres strengthening exercises. Pt to bring in X-rays to next appt. Patient will continue to benefit from skilled OT services to modify and progress therapeutic interventions, address ROM deficits, address strength deficits and analyze and address soft tissue restrictions to attain remaining goals. [x]  See Plan of Care  []  See progress note/recertification  []  See Discharge Summary         Progress towards goals / Updated goals:  1. Patient will have 70 degrees of right wrist extension in order to increase ease with ADL's such as bathing, eating and dressing and to eventually bear weight thru the right upper extremity as in pushing up from a chair. Status at eval: 37  54- 3/9/18  2.  Patient will have 75 degrees of right wrist flexion in order to perform hygiene tasks and /or work with tools such as a screwdriver. Status at eval: 55  54-3/9/18  3. Patient will have 50 pounds of  in the right hand to allow for functional grasp for all ADL activities including dressing, bathing and self care. At Eval: 5  10# 3/8/18  4. Patient will have improved right lateral pinch strength of  15 pounds in order to perform fine motor tasks such as turning pages, turning ignition and open containers. At Eval: 6  7# 3/8/18  5. Patient will report no difficulty carrying a shopping bag or briefcase on FOTO outcome measure in order to demonstrate improved functional performance.   Status at Eval: Unable to do*    PLAN  []  Upgrade activities as tolerated     [x]  Continue plan of care  []  Update interventions per flow sheet       []  Discharge due to:_  []  Other:_      Les Santana OT 3/16/2018  4:20 PM    Future Appointments  Date Time Provider Bandar Jacobsen   3/21/2018 4:00 PM Les Santana OT [facility]   3/23/2018 3:30 PM Les Santana OT [facility]   3/26/2018 5:00 PM Les Santana OT [facility]   3/29/2018 10:30 AM Les Santana OT [facility]   4/4/2018 5:00 PM Les Santana OT [facility]   4/6/2018 5:00 PM Les Santana OT [facility]   4[facility]9/2018 5:00 PM [facility]

## 2018-03-21 ENCOUNTER — APPOINTMENT (OUTPATIENT)
Dept: PHYSICAL THERAPY | Age: 63
End: 2018-03-21
Payer: COMMERCIAL

## 2018-03-23 ENCOUNTER — HOSPITAL ENCOUNTER (OUTPATIENT)
Dept: PHYSICAL THERAPY | Age: 63
Discharge: HOME | End: 2018-03-23
Payer: COMMERCIAL

## 2018-03-23 PROCEDURE — 97110 THERAPEUTIC EXERCISES: CPT

## 2018-03-23 PROCEDURE — 97035 APP MDLTY 1+ULTRASOUND EA 15: CPT

## 2018-03-23 NOTE — PROGRESS NOTES
OT DAILY TREATMENT NOTE - non [facility] 3-16    Patient Name: Kaya Mustafa  Date:3/23/2018  : 1955  [x]  Patient  Verified  Payor: BLUE RACHEL / Plan: Tomeka Mtz 5747 PPO / Product Type: PPO /    In time: 3:25  Out time: 4:20  Total Treatment Time (min): 55  Visit #: 2 of 8    Treatment Area: Other extraarticular fracture of lower end of right radius, subsequent encounter for closed fracture with routine healing [M29.630H]    SUBJECTIVE  Pain Level (0-10 scale): 5/10  Any medication changes, allergies to medications, adverse drug reactions, diagnosis change, or new procedure performed?: [x] No    [] Yes (see summary sheet for update)  Subjective functional status/changes:   [] No changes reported  \"My thumb seems to be the biggest issue now, it gets really swollen when I use it. \"    OBJECTIVE  Modality rationale: decrease edema, decrease inflammation, decrease pain and increase tissue extensibility to improve the patients ability to move right wrist thru ROM tasks.    Min Type Additional Details    [] Estim:  []Unatt       []IFC  []Premod                        []Other:  []w/ice   []w/heat  Position:  Location:    [] Estim: []Att    []TENS instruct  []NMES                    []Other:  []w/US   []w/ice   []w/heat  Position:  Location:    []  Traction: [] Cervical       []Lumbar                       [] Prone          []Supine                       []Intermittent   []Continuous Lbs:  [] before manual  [] after manual   16 [x]  Ultrasound: []Continuous   [x] Pulsed 50%                           []1MHz   [x]3MHz Location: dorsum/volar right hand  W/cm2: 1.0    []  Iontophoresis with dexamethasone         Location: [] Take home patch   [] In clinic   10 []  Ice     [x]  heat  []  Ice massage  []  Laser   []  Anodyne Position:  Location: right hand/wrist    []  Laser with stim  []  Other: Position:  Location:    []  Vasopneumatic Device Pressure:       [] lo [] med [] hi   Temperature: [] lo [] med [] hi   [x] Skin assessment post-treatment:  [x]intact []redness- no adverse reaction    []redness - adverse reaction:   29 min Therapeutic Exercise:  [x] See flow sheet :   Rationale: increase ROM and increase strength to improve the patients ability to move wrist thru pain-free ROM. With   [x] TE   [] TA   [] neuro   [] other: Patient Education: [x] Review HEP    [] Progressed/Changed HEP based on:   [] positioning   [] body mechanics   [] transfers   [] heat/ice application   [] Splint wear/care   [] Sensory re-education   [] scar management      [] other:             Other Objective/Functional Measures: Range of Motion:   Elbow/Wrist   Wrist Right  2/1/18 Left  2/1/18     3/9/18          Flex 55 90 54           Ext 37 80   54         UD 20 30   28         RD 5 20   15         FA                   Pro 80 80            Sup 65 80            Elbow                  Flex 150               Ext 0 0                    Strength:   Measurements: Taken with Devon Dynamometer, in Lbs   Level 2 Date  2/1/18 Date  3/9/18 Date Date Date Date Date   Right 5   10                  Left 50   37                  Deficit 39                     Change    [place]                 Pinch Measurements: Taken with Pinch Gauge, in Lbs   (hand) Date  2/1/18 Date  3/9/18 Date Date Date Date   Lateral                      Right 6   7               Left  17   16               Deficit 9                  Change                     Pad                     Right 3   4               Left 10   10               Deficit 7                  Change                     Paco                     Right 4   5               Left 11   10               Deficit 7                  Change                         Pain Level (0-10 scale) post treatment: 4/10    ASSESSMENT/Changes in Function: Pt instructed in weight bearing thru UEs to do for HEP, pt demonstrated 100% carryover.      Patient will continue to benefit from skilled OT services to modify and progress therapeutic interventions, address ROM deficits, address strength deficits and analyze and address soft tissue restrictions to attain remaining goals. []  See Plan of Care  [x]  See progress note/recertification  []  See Discharge Summary         Progress towards goals / Updated goals:  1. Patient will have 70 degrees of right wrist extension in order to increase ease with ADL's such as bathing, eating and dressing and to eventually bear weight thru the right upper extremity as in pushing up from a chair. Status at eval: 37  54- 3/9/18  2.  Patient will have 75 degrees of right wrist flexion in order to perform hygiene tasks and /or work with tools such as a screwdriver. Status at eval: 55  54-3/9/18  3. Patient will have 50 pounds of  in the right hand to allow for functional grasp for all ADL activities including dressing, bathing and self care. At Eval: 5  10# 3/8/18  4. Patient will have improved right lateral pinch strength of  15 pounds in order to perform fine motor tasks such as turning pages, turning ignition and open containers. At Eval: 6  7# 3/8/18  5. Patient will report no difficulty carrying a shopping bag or briefcase on FOTO outcome measure in order to demonstrate improved functional performance.   Status at Eval: Unable to do*    PLAN  []  Upgrade activities as tolerated     [x]  Continue plan of care  []  Update interventions per flow sheet       []  Discharge due to:_  []  Other:_      Ival Imus, OT 3/23/2018  3:29 PM    Future Appointments  Date Time Provider Bandar Jacobsen   3/23/2018 3:30 PM Ival Imus, OT [facility]   3/26/2018 5:00 PM Ival Imus, OT [facility]   3/29/2018 10:30 AM Ival Imus, OT [facility]   4/4/2018 5:00 PM Ival Imus, OT [facility]   4/6/2018 5:00 PM Ival Imus, OT [facility]   4/9/2018 5:00 PM Ival Imus, OT [facility]

## 2018-03-26 ENCOUNTER — APPOINTMENT (OUTPATIENT)
Dept: PHYSICAL THERAPY | Age: 63
End: 2018-03-26
Payer: COMMERCIAL

## 2018-03-29 ENCOUNTER — HOSPITAL ENCOUNTER (OUTPATIENT)
Dept: PHYSICAL THERAPY | Age: 63
Discharge: HOME | End: 2018-03-29
Payer: COMMERCIAL

## 2018-03-29 PROCEDURE — 97110 THERAPEUTIC EXERCISES: CPT

## 2018-03-29 NOTE — PROGRESS NOTES
OT DAILY TREATMENT NOTE - non [facility] 3-16    Patient Name: Sylvia Ramos  Date:3/29/2018  : 1955  [x]  Patient  Verified  Payor: BLUE CROSS / Plan: Tomeka Mtz [id number] PPO / Product Type: PPO /    In time: 10:27  Out time:11:02  Total Treatment Time (min): 35  Visit #: 3 of 8    Treatment Area: Other extraarticular fracture of lower end of right radius, subsequent encounter for closed fracture with routine healing [V90.372M]    SUBJECTIVE  Pain Level (0-10 scale): 0/10  Any medication changes, allergies to medications, adverse drug reactions, diagnosis change, or new procedure performed?: [x] No    [] Yes (see summary sheet for update)  Subjective functional status/changes:   [] No changes reported  \"I'm not having any pain today but it could be because I had to take a Lidocaine before bed last night because I was in so much pain and I haven't been using it much this morning. \"    OBJECTIVE    Modality rationale: decrease inflammation, decrease pain and increase tissue extensibility to improve the patients ability to move right wrist thru pain-free ROM.    Min Type Additional Details    [] Estim:  []Unatt       []IFC  []Premod                        []Other:  []w/ice   []w/heat  Position:  Location:    [] Estim: []Att    []TENS instruct  []NMES                    []Other:  []w/US   []w/ice   []w/heat  Position:  Location:    []  Traction: [] Cervical       []Lumbar                       [] Prone          []Supine                       []Intermittent   []Continuous Lbs:  [] before manual  [] after manual   10 [x]  Ultrasound: []Continuous   [x] Pulsed 50%                           []1MHz   [x]3MHz Location: Volar and dorsum right wrist  W/cm2: 1.0    []  Iontophoresis with dexamethasone         Location: [] Take home patch   [] In clinic   10 []  Ice     [x]  heat  []  Ice massage  []  Laser   []  Anodyne Position:  Location: Right hand/wrist    []  Laser with stim  []  Other: Position:  Location: []  Vasopneumatic Device Pressure:       [] lo [] med [] hi   Temperature: [] lo [] med [] hi   [x] Skin assessment post-treatment:  [x]intact []redness- no adverse reaction    []redness - adverse reaction:   25 min Therapeutic Exercise:  [x] See flow sheet :   Rationale: increase ROM and increase strength to improve the patients ability to increase use of Right hand for functional tasks and within pain-free ROM.     With   [x] TE   [] TA   [] neuro   [] other: Patient Education: [x] Review HEP    [] Progressed/Changed HEP based on:   [] positioning   [] body mechanics   [] transfers   [] heat/ice application   [] Splint wear/care   [] Sensory re-education   [] scar management      [] other:             Other Objective/Functional Measures:Range of Motion:   Elbow/Wrist   Wrist Right  2/1/18 Left  2/1/18     3/9/18          Flex 55 90 54           Ext 37 80   54         UD 20 30   28         RD 5 20   15         FA                   Pro 80 80            Sup 65 80            Elbow                  Flex 150               Ext 0 0                    Strength:   Measurements: Taken with Devon Dynamometer, in Lbs   Level 2 Date  2/1/18 Date  3/9/18 Date Date Date Date Date   Right 5   10                  Left 48   37                  Deficit 39                     Change    [address]                 Pinch Measurements: Taken with Pinch Gauge, in Lbs   (hand) Date  2/1/18 Date  3/9/18 Date Date Date Date   Lateral                      Right 6   7               Left  17   16               Deficit 9                  Change                     Pad                     Right 3   4               Left 10   10               Deficit 7                  Change                     Paco                     Right 4   5               Left 11   10               Deficit 7                  Change                           Pain Level (0-10 scale) post treatment: 0/10    ASSESSMENT/Changes in Function: Pt tolerated therapeutic exercises well however reported she needed to leave session early due to conflicting appt. Patient will continue to benefit from skilled OT services to modify and progress therapeutic interventions, address ROM deficits, address strength deficits and analyze and address soft tissue restrictions to attain remaining goals. []  See Plan of Care  [x]  See progress note/recertification  []  See Discharge Summary         Progress towards goals / Updated goals:  1. Patient will have 70 degrees of right wrist extension in order to increase ease with ADL's such as bathing, eating and dressing and to eventually bear weight thru the right upper extremity as in pushing up from a chair. Status at eval: 37  54- 3/9/18  2.  Patient will have 75 degrees of right wrist flexion in order to perform hygiene tasks and /or work with tools such as a screwdriver. Status at eval: 55  54-3/9/18  3. Patient will have 50 pounds of  in the right hand to allow for functional grasp for all ADL activities including dressing, bathing and self care. At Eval: 5  10# 3/8/18  4. Patient will have improved right lateral pinch strength of  15 pounds in order to perform fine motor tasks such as turning pages, turning ignition and open containers. At Eval: 6  7# 3/8/18  5. Patient will report no difficulty carrying a shopping bag or briefcase on FOTO outcome measure in order to demonstrate improved functional performance.   Status at Eval: Unable to do*    PLAN  []  Upgrade activities as tolerated     [x]  Continue plan of care  []  Update interventions per flow sheet       []  Discharge due to:_  []  Other:_      [place] Pérez OT 3/29/2018  10:31 AM    Future Appointments  Date Time Provider Bandar Jacobsen   4/4/2018 5:00 PM [place] Pérez OT [facility]   [facility]2018 5:30 [facility]   4/9/2018 5:00 PM [place] [facility]

## 2018-04-04 ENCOUNTER — HOSPITAL ENCOUNTER (OUTPATIENT)
Dept: PHYSICAL THERAPY | Age: 63
Discharge: HOME | End: 2018-04-04
Payer: COMMERCIAL

## 2018-04-04 PROCEDURE — 97110 THERAPEUTIC EXERCISES: CPT

## 2018-04-04 PROCEDURE — 97035 APP MDLTY 1+ULTRASOUND EA 15: CPT

## 2018-04-04 NOTE — PROGRESS NOTES
OT DAILY TREATMENT NOTE - non [facility] 3-16    Patient Name: Calvin Srinivasan  Date:2018  : 1955  [x]  Patient  Verified  Payor: Elois Schlatter / Plan: Tomeka Mtz [id number] PPO / Product Type: PPO /    In time: 5:00  Out time: 5:45  Total Treatment Time (min): 45  Visit #: 4 of 8    Treatment Area: Other extraarticular fracture of lower end of right radius, subsequent encounter for closed fracture with routine healing [C76.493B]    SUBJECTIVE  Pain Level (0-10 scale): 3/10  Any medication changes, allergies to medications, adverse drug reactions, diagnosis change, or new procedure performed?: [x] No    [] Yes (see summary sheet for update)  Subjective functional status/changes:   [x] No changes reported    OBJECTIVE  Modality rationale: decrease edema, decrease inflammation, decrease pain and increase tissue extensibility to improve the patients ability to move right wrist/hand thru pain-free ROM.    Min Type Additional Details    [] Estim:  []Unatt       []IFC  []Premod                        []Other:  []w/ice   []w/heat  Position:  Location:    [] Estim: []Att    []TENS instruct  []NMES                    []Other:  []w/US   []w/ice   []w/heat  Position:  Location:    []  Traction: [] Cervical       []Lumbar                       [] Prone          []Supine                       []Intermittent   []Continuous Lbs:  [] before manual  [] after manual   10 [x]  Ultrasound: []Continuous   [x] Pulsed 50%                           []1MHz   [x]3MHz Location: Right dorsum wrist  W/cm2: 1.0    []  Iontophoresis with dexamethasone         Location: [] Take home patch   [] In clinic    []  Ice     []  heat  []  Ice massage  []  Laser   []  Anodyne Position:  Location:    []  Laser with stim  []  Other: Position:  Location:    []  Vasopneumatic Device Pressure:       [] lo [] med [] hi   Temperature: [] lo [] med [] hi   [x] Skin assessment post-treatment:  []intact []redness- no adverse reaction    []redness - adverse reaction:   35 min Therapeutic Exercise:  [x] See flow sheet :   Rationale: increase ROM and increase strength to improve the patients ability to increase use of right hand for functional tasks. With   [x] TE   [] TA   [] neuro   [] other: Patient Education: [x] Review HEP    [] Progressed/Changed HEP based on:   [] positioning   [] body mechanics   [] transfers   [] heat/ice application   [] Splint wear/care   [] Sensory re-education   [] scar management      [] other:             Other Objective/Functional Measures: Range of Motion:   Elbow/Wrist   Wrist Right  2/1/18 Left  2/1/18     3/9/18  Right  4/4/2018         Flex 55 90 54   76        Ext 37 80   54   55      UD 20 30   28         RD 5 20   15         FA                   Pro 80 80            Sup 65 80            Elbow                  Flex 150               Ext 0 0                    Strength:   Measurements: Taken with Devon Dynamometer, in Lbs   Level 2 Date  2/1/18 Date  3/9/18 4/4/2018  Date Date Date Date   Right 5   10   10               Left 50   37   40               Deficit 39      30               Change         0                    Pinch Measurements: Taken with Pinch Gauge, in Lbs   (hand) Date  2/1/18 Date  3/9/18 4/4/2018  Date Date Date   Lateral                      Right 6   7   10            Left  17   16   17            Deficit 9    7              Change         +3            Pad                     Right 3   4               Left 10   10               Deficit 7                  Change                     Paco                     Right 4   5               Left 11   10               Deficit 7                  Change                       Pain Level (0-10 scale) post treatment: 2/10    ASSESSMENT/Changes in Function:Slow progression towards goals due to continued pain reported in right dorsum wrist and right thumb.   Pain limits ability to perform and progress with strengthening ex's and continues to be a functional limitation. Patient will continue to benefit from skilled OT services to modify and progress therapeutic interventions, address ROM deficits, address strength deficits and analyze and address soft tissue restrictions to attain remaining goals. []  See Plan of Care  [x]  See progress note/recertification  []  See Discharge Summary         Progress towards goals / Updated goals:  1. Patient will have 70 degrees of right wrist extension in order to increase ease with ADL's such as bathing, eating and dressing and to eventually bear weight thru the right upper extremity as in pushing up from a chair. Progressing 4/4/18  Status at eval: 37  54- 3/9/18  55 - 4/4/18  2.  Patient will have 75 degrees of right wrist flexion in order to perform hygiene tasks and /or work with tools such as a screwdriver. Goal Met 4/4/18  Status at [facility]: 55  54-3/9/18  76 - 4/4/18  3. Patient will have 50 pounds of  in the right hand to allow for functional grasp for all ADL activities including dressing, bathing and self care. Progressing 4/4/18  At Eval: 5  10# 3/8/18  10 lbs - 4/4/18  4. Patient will have improved right lateral pinch strength of  15 pounds in order to perform fine motor tasks such as turning pages, turning ignition and open containers. Progressing 4/4/18  At Eval: 6  7# 3/8/18  10 lbs - 4/4/18  5. Patient will report no difficulty carrying a shopping bag or briefcase on FOTO outcome measure in order to demonstrate improved functional performance.  Progressing 4/4/18  Status at Eval: Unable to do  Status at note: moderate difficulty    PLAN  []  Upgrade activities as tolerated     [x]  Continue plan of care  []  Update interventions per flow sheet       []  Discharge due to:_  []  Other:_      Tawnya Patterson OT 4/4/2018  5:04 PM    Future Appointments  Date Time Provider Bandar Jacobsen   4/5/2018 5:30 PM Tawnya Patterson OT [facility]   4/9/2018 5:00 PM [facility]

## 2018-04-04 NOTE — PROGRESS NOTES
[facility]   [address]  [place], [address]  [phone] fax    Occupational Therapy Physician Update  [x] Progress Note  [] Discharge Summary  Patient name: Singh Bear Start of Care: 2018   Referral source: [facility]* : 1955   Medical/Treatment Diagnosis: Other extraarticular fracture of lower end of right radius, subsequent encounter for closed fracture with routine healing [S52.551D] Onset Date:2017     Prior Hospitalization: see medical history Provider#: [id number]   Medications: Verified on Patient Summary List    Comorbidities: arthritis, high BP  Prior Level of Function:able to perform daily activities without functional limitations.   Visits from Start of Care: 10    Missed Visits: 2    Status at Evaluation/Last Progress Note: Range of Motion:   Elbow/Wrist   Wrist Right  18 Left  2/1/18     3/9/18  Right  2018         Flex 55 90 54   76        Ext 37 80   54   55      UD 20 30   28         RD 5 20   15         FA                   Pro 80 80            Sup 65 80            Elbow                  Flex 150               Ext 0 0                    Strength:   Measurements: Taken with Devon Dynamometer, in Lbs   Level 2 Date  18 Date  3/9/18 2018  Date Date Date Date   Right 5   10   10               Left 50   37   40               Deficit 45      30               Change         0                    Pinch Measurements: Taken with Pinch Gauge, in Lbs   (hand) Date  18 Date  3/9/18 2018  Date Date Date   Lateral                      Right 6   7   10            Left  17   16   17            Deficit 9    7              Change         +3            Pad                     Right 3   4               Left 10   10               Deficit 7                  Change                     Paco                     Right 4   5               Left 11   10               Deficit 7                Change                     Progress towards Goals:  1. Patient will have 70 degrees of right wrist extension in order to increase ease with ADL's such as bathing, eating and dressing and to eventually bear weight thru the right upper extremity as in pushing up from a chair. Discontinue 4/4/18  Status at eval: 37  54- 3/9/18  55 - 4/4/18  2.  Patient will have 75 degrees of right wrist flexion in order to perform hygiene tasks and /or work with tools such as a screwdriver. Goal Met 4/4/18  Status at eval: 55  54-3/9/18  76 - 4/4/18  3. Patient will have 50 pounds of  in the right hand to allow for functional grasp for all ADL activities including dressing, bathing and self care. Progressing 4/4/18  At Eval: 5  10# 3/8/18  10 lbs - 4/4/18  4. Patient will have improved right lateral pinch strength of  15 pounds in order to perform fine motor tasks such as turning pages, turning ignition and open containers. Progressing 4/4/18  At Eval: 6  7# 3/8/18  10 lbs - 4/4/18  5. Patient will report no difficulty carrying a shopping bag or briefcase on FOTO outcome measure in order to demonstrate improved functional performance. Progressing 4/4/18  Status at [facility]: Unable to do  Status at note: moderate difficulty   Goals: to be achieved in 3 weeks:  1. Patient will have 60 degrees of right wrist extension in order to increase ease with ADL's such as bathing, eating and dressing and to eventually bear weight thru the right upper extremity as in pushing up from a chair. Status at eval: 37  54- 3/9/18  55 - 4/4/18  2. Patient will have 50 pounds of  in the right hand to allow for functional grasp for all ADL activities including dressing, bathing and self care. At Eval: 5  10# 3/8/18  10 lbs - 4/4/18  3. Patient will have improved right lateral pinch strength of  15 pounds in order to perform fine motor tasks such as turning pages, turning ignition and open containers. At Eval: 6  7# 3/8/18  10 lbs - 4/4/18  4. Patient will report no difficulty carrying a shopping bag or briefcase on FOTO outcome measure in order to demonstrate improved functional performance. Status at Eval: Unable to do  Status at note: moderate difficulty   ASSESSMENT/RECOMMENDATIONS: Slow progression towards goals due to continued pain reported in right dorsum wrist and right thumb. Pain limits ability to perform and progress with strengthening ex's and continues to be a functional limitation. [x]Continue therapy per initial plan/protocol at a frequency of  2 x per week for 3 weeks  []Continue therapy with the following recommended changes:_____________________      _____________________________________________________________________  []Discontinue therapy progressing towards or have reached established goals  []Discontinue therapy due to lack of appreciable progress towards goals  []Discontinue therapy due to lack of attendance or compliance  []Await Physician's recommendations/decisions regarding therapy  []Other:________________________________________________________________    Thank you for this referral. Brandi Romero OT 4/4/2018 6:33 PM  NOTE TO PHYSICIAN:  PLEASE COMPLETE THE ORDERS BELOW AND   FAX TO [facility] [place] [facility]: [phone]  If you are unable to process this request in 24 hours please contact our office: [phone]    ? I have read the above report and request that my patient continue as recommended. ? I have read the above report and request that my patient continue therapy with the following changes/special instructions:___________________________________________________________  ? I have read the above report and request that my patient be discharged from therapy.     Physicians signature: ______________________________Date: ______Time:______

## 2018-04-05 ENCOUNTER — APPOINTMENT (OUTPATIENT)
Dept: PHYSICAL THERAPY | Age: 63
End: 2018-04-05
Payer: COMMERCIAL

## 2018-04-06 ENCOUNTER — APPOINTMENT (OUTPATIENT)
Dept: PHYSICAL THERAPY | Age: 63
End: 2018-04-06
Payer: COMMERCIAL

## 2018-04-27 NOTE — PROGRESS NOTES
[facility]  [address], [facility] [address]  [phone] fax    Occupational Therapy Discharge Summary    Patient name: Marko Vega Start of Care: 2018   Referral source: Angle Salas* : 1955   Medical/Treatment Diagnosis: Other extraarticular fracture of lower end of right radius, subsequent encounter for closed fracture with routine healing [S52.551D] Onset Date:2017     Prior Hospitalization: see medical history Provider#: [id number]   Medications: Verified on Patient Summary List    Comorbidities: arthritis, high BP  Prior Level of Function:Able to perform daily activities without functional limitations. Visits from Start of Care: 10    Missed Visits: 2  Reporting Period : 3/9/2018 to 2018    Summary of Care:  Goals: to be achieved in 3 weeks:  1. Patient will have 60 degrees of right wrist extension in order to increase ease with ADL's such as bathing, eating and dressing and to eventually bear weight thru the right upper extremity as in pushing up from a chair. Status at eval: 37  54- 3/9/18  55 - 18  2. Patient will have 50 pounds of  in the right hand to allow for functional grasp for all ADL activities including dressing, bathing and self care. At Eval: 5  10# 3/8/18  10 lbs - 18  3. Patient will have improved right lateral pinch strength of  15 pounds in order to perform fine motor tasks such as turning pages, turning ignition and open containers. At Eval: 6  7# 3/8/18  10 lbs - 18  4. Patient will report no difficulty carrying a shopping bag or briefcase on FOTO outcome measure in order to demonstrate improved functional performance. Status at Eval: Unable to do  Status at note: moderate difficulty       ASSESSMENT/RECOMMENDATIONS: Pt was seen on 2018 in which a progress note was written to continue therapy 2x/wk for 3 wks however pt failed to return to follow up appts. After pt NS on 4/9, pt was called several times to reschedule follow up appts, pt did not call back to reschedule. Unable to further assess goals.   [x]Discontinue therapy: []Patient has reached or is progressing toward set goals      [x]Patient is non-compliant or has abdicated      []Due to lack of appreciable progress towards set goals    [place] Pérez OT 4/27/2018 3:28 PM

## 2020-03-27 PROBLEM — M50.00 CERVICAL DISC DISEASE WITH MYELOPATHY: Status: ACTIVE | Noted: 2020-03-27

## 2020-03-30 PROBLEM — M50.00 CERVICAL DISC DISEASE WITH MYELOPATHY: Status: RESOLVED | Noted: 2020-03-27 | Resolved: 2020-03-30

## 2020-07-21 ENCOUNTER — HOSPITAL ENCOUNTER (OUTPATIENT)
Dept: PHYSICAL THERAPY | Age: 65
Discharge: HOME | End: 2020-07-21
Payer: COMMERCIAL

## 2020-07-21 PROCEDURE — 97161 PT EVAL LOW COMPLEX 20 MIN: CPT

## 2020-07-21 PROCEDURE — 97110 THERAPEUTIC EXERCISES: CPT

## 2020-07-21 NOTE — PROGRESS NOTES
[facility]  [address]  [place], [address]  [phone] fax    Plan of Care/ Statement of Necessity for Physical Therapy Services    Patient name: Ana Cristina Acharya Start of Care: 2020   Referral source: Pradeep Alvarez MD : 1955    Medical Diagnosis: Balance problem [R26.89]  Payor: BLUE CROSS / Plan: [facility] PPO / Product Type: PPO /  Onset Date: 2020    Treatment Diagnosis: Unsteadiness on feet R26.81   Prior Hospitalization: see medical history Provider#: [id number]   Medications: Verified on Patient summary List    Comorbidities: s/p C4-C5 decompression and fusion 3/27/2020   Prior Level of Function: independent     The Plan of Care and following information is based on the information from the initial evaluation. Assessment/ key information: Patient presents with c/o unsteadiness and impaired balance that has been occurring just before her spinal surgery 3/27/2020. Patient is s/p C4-C5 decompression and fusion surgery and is recovering well. Denies vertigo, lightheadedness, nausea, dizziness, vision or auditory changes. States she feels like she is going to fall at home while doing dual tasking or turning suddenly and changing directions. Denies falls at home. States she has had near falls but uses her arms to catch herself on the counter, couch, etc. Denies cervical pain at rest. Patient presents with impaired balance based on the following objective measures:  Sharpened Romberg 30 sec eyes open and 7 seconds eyes closed, single leg stance 19 sec on  Left and 30 sec on right, dynamic gait index 17/24 indicating increased risk of falls. WFL for standard Romberg test and standing on foam. +VOR testing with c/o feeling uneasy and \"off balance. \" Patient would benefit from skilled physical therapy to address balance and proprioception deficits to reduce risk of falls and reduce fear of falling. Evaluation Complexity History MEDIUM  Complexity : 1-2 comorbidities / personal factors will impact the outcome/ POC ; Examination MEDIUM Complexity : 3 Standardized tests and measures addressing body structure, function, activity limitation and / or participation in recreation  ;Presentation MEDIUM Complexity : Evolving with changing characteristics  ; Clinical Decision Making LOW Complexity : FOTO score of   Overall Complexity Rating: LOW   Problem List: impaired gait/ balance and decrease ADL/ functional abilitiies   Treatment Plan may include any combination of the following: Therapeutic exercise, Therapeutic activities, Neuromuscular re-education, Physical agent/modality, Gait/balance training, Manual therapy, Patient education, Self Care training, Functional mobility training, Home safety training and Stair training  Patient / Family readiness to learn indicated by: asking questions, trying to perform skills and interest  Persons(s) to be included in education: patient (P)  Barriers to Learning/Limitations: None  Patient Goal (s): I want to not be so conscious about my balance   Patient Self Reported Health Status: good  Rehabilitation Potential: good    Short Term Goals: To be accomplished in 2-3 weeks:   1) Patient will improve single leg stance on left to 25 seconds to promote stability with stair navigation. 2)  Patient will improve sharpened Romberg eyes closed to 30 sec to promote balance in dark spaces. 3)  Patient will improve dynamic gait index from 17/24 to 20/24 in order to reduce risk of falls. Long Term Goals: To be accomplished in 4-8 weeks:   1) Patient will improve single leg stance on left to 30 seconds to promote stability with stair navigation. 2)  Patient will improve dynamic gait index from 24/24 in order to reduce risk of falls.    3)  Patient will report less than 2/10 dizziness with VOR exercise 1 (card stable with head turns) in order to improve stability with dynamic gait and turning head while driving. Frequency / Duration: Patient to be seen 2-3 times per week for 8 weeks. Patient/ Caregiver education and instruction: Diagnosis, prognosis, exercises   [x]  Plan of care has been reviewed with ROVERTO Ruelas DPT 7/21/2020 1:38 PM    ________________________________________________________________________    I certify that the above Therapy Services are being furnished while the patient is under my care. I agree with the treatment plan and certify that this therapy is necessary.     Physician's Signature:____________Date:_________TIME:________    ** Signature, Date and Time must be completed for valid certification **    Please sign and return to [facility] [address] [place] [address] & [facility]  [address] [place] [address], [facility] [address]  [phone] fax

## 2020-07-21 NOTE — PROGRESS NOTES
PHYSICAL THERAPY - Evaluation    Patient Name: Leandro Mcmahon        Date: 2020  : 1955   YES Patient  Verified  Visit #:      of   16  Insurance: Payor: BLUE CROSS / Plan: [facility] PPO / Product Type: PPO /      In time: 9:35 Out time: 10:37   Total Treatment Time: 62     Medicare Time Tracking (below)   Total Timed Codes (min):  n/a 1:1 Treatment Time:  n/a     TREATMENT AREA =  Balance/unsteadiness on feet    SUBJECTIVE    Pain Level (on 0 to 10 scale):  0  / 10   Medication Changes/New allergies or changes in medical history, any new surgeries or procedures? YES    If yes, update Summary List   Subjective Functional Status/Changes:  []  No changes reported       History of Condition: Patient s/p posterior decompression and fusion C4-C5 2020. Patient started to notice deficits in balance prior to surgery. Denies any falls but endorsed near-falls where she had to catch herself. Notices balance dficits when she is doing dual tasking or turning when walking. Denies vertigo, denies lightheadedness, vision changes, auditory changes. BUE numbness/tingling improved since spinal surgery. Aggravating Factors: turning head and walking, or turning head    Alleviating Factors: none    Previous Treatment: none    PMHx:see chart    Social/Recreational/Work: Living with . Denies functional mobility concerns around the house.  Not working    Pt Goals: \"Not to be so conscious about my balance\"    FOTO: to be completed end of session       OBJECTIVE    Physical Therapy Evaluation - Vestibular    Posture:  [x] WNL      [] Forward head    [] Protracted shoulders    [] Retracted shoulders  [] Kyphosis:  [] increased   [] decreased   [] Lordosis:   [] increased   [] decreased  Other:    C/S ROM: [] WFL    [x] Limited    Describe:50% but adhering to spinal precautions after surgery    Strength: [x] [facility]    [] Limited    Describe:    Optional Tests:  Sensation:  [x] Intact [] Diminished    Describe:    Proprioception: [x] Intact [] Diminished    Describe:    Coordination Testing:       Disdiadochokinesia [] WFL    [] Impaired    Describe:       Heel - Guzman  [] WFL    [] Impaired    Describe:       FNF   [] WFL    [] Impaired    Describe: Toe Tap   [] WFL    [] Impaired    Describe:    Oculomotor Tests: (Fixation Not Blocked)       Ocular ROM:   [x] WFL    [] Limited    Describe:       Spontaneous Nystag. [x] Neg     [] Pos    [] Left    [] Right       Gaze Holding Nystag. [x] Neg     [] Pos    [] Left    [] Right        Smooth Pursuit  [x] Neg     [] Pos    [] Left    [] Right        Saccades   [x] Neg     [] Pos    [] Left    [] Right        VOR - Slow Head Mvmt [] Neg     [x] Pos    [] Left    [] Right        VOR - Fast Head Mvmt [] Neg     [] Pos    [] Left    [] Right        Head Thrust  [] Neg     [] Pos    [] Left    [] Right        Static Visual Acuity [] Neg     [] Pos    [] Left    [] Right        Dynamic Visual Acuity [] Neg     [] Pos    [] Left    [] Right     Oculomotor Tests: (Fixation Blocked)       Spontaneous Nystag. [] Neg     [] Pos    [] Left    [] Right       Gaze Holding Nystag. [] Neg     [] Pos    [] Left    [] Right        Head-Shaking Nystag. [] Neg     [] Pos    [] Left    [] Right    Other Special Tests:       Vertebral Artery Testing [] Neg     [] Pos    [] Left    [] Right       Hallpike-Naseem Maneuver [] Neg     [] Pos    [] Left    [] Right       Roll Test   [] Neg     [] Pos    [] Left    [] Right       Frank Balance Scale [] Neg     [] Pos    Score:       Dynamic Gait Index [] Neg     [] Pos    Score:       Functional Gait Assess.  [] Neg     [] Pos    Score:    Balance Standard Testing (Eyes Open/Eyes Closed - EO/EC)       Romberg   [x] WFL    [] Pos    Describe:           Stand on Foam  [x] WFL    [] Pos    Describe:        Standing on Rail  [] WFL    [] Pos    Describe:        Sharpened Romberg [] WFL    [x] Pos    Describe:  30 sec EO; 7 sec EC       Single Leg Stand  [] [facility]    [x] Pos    Describe: 19 sec L, 30 sec R    Motion Sensitivity:  [x] Neg     [] Pos    Describe:    Computerized Dynamic Posturography:        [x] Not Tested    [] WFL    Score: Other Tests: DGI 17/24      47 min [x]Eval         15 min Therapeutic Exercise:  [x]  See flow sheet   Rationale:      improve coordination, improve balance and increase proprioception to improve the patients ability to perform mobility and ADLs with less fear of falling and improved balance. min Patient Education:  YES  Initiated HEP         Other Objective/Functional Measures:    DGI:  17/24     Post Treatment Pain Level (on 0 to 10) scale:   0  / 10     ASSESSMENT    Assessment/Changes in Function:     Justification for Eval Code Complexity:  Patient History (low 0, mod 1-2, high 3-4): MOD  Examination (low 1-2, mod 3+, high 4+): MOD  Clinical Presentation (low stable or uncomplicated, mod evolving or changing, high unstable or unpredictable): MOD  Clinical Decision Making (low , mod 26-74, high 1-25): FOTO LOW    Patient presents with impaired dynamic balance with fear of falling and reports of near-falls at home. ROM, Strength WFL. Neg Romberg test but Pos sharpeneded ROM. DGI 17/24 indicating higher risk of falls with most difficulty with gait with head turns horizontal, gait with changes in gait speed, and gait with turns. Patient will continue to benefit from skilled PT services to modify and progress therapeutic interventions, address functional mobility deficits, analyze and cue movement patterns, analyze and modify body mechanics/ergonomics, assess and modify postural abnormalities and address imbalance/dizziness to attain remaining goals.      Progress toward goals / Updated goals:    Goals initiated     PLAN    []  Upgrade activities as tolerated YES Continue plan of care   []  Discharge due to :    []  Other:      Therapist: Michael García    Date: 7/21/2020 Time: 9:47 AM

## 2020-07-28 ENCOUNTER — APPOINTMENT (OUTPATIENT)
Dept: PHYSICAL THERAPY | Age: 65
End: 2020-07-28
Payer: COMMERCIAL

## 2020-11-18 ENCOUNTER — HOSPITAL ENCOUNTER (OUTPATIENT)
Dept: PHYSICAL THERAPY | Age: 65
Discharge: HOME | End: 2020-11-18
Payer: COMMERCIAL

## 2020-11-18 PROCEDURE — 97140 MANUAL THERAPY 1/> REGIONS: CPT

## 2020-11-18 PROCEDURE — 97162 PT EVAL MOD COMPLEX 30 MIN: CPT

## 2020-11-18 PROCEDURE — 97535 SELF CARE MNGMENT TRAINING: CPT

## 2020-11-18 PROCEDURE — 97110 THERAPEUTIC EXERCISES: CPT

## 2020-11-18 NOTE — PROGRESS NOTES
PT DAILY TREATMENT NOTE     Patient Name: Maxine Valente  Date:2020  : 1955  [x]  Patient  Verified  Payor: BLUE CROSS / Plan: [facility] PPO / Product Type: PPO /    In time:604  Out time:651  Total Treatment Time (min): 52  Visit #: 1 of 8    Medicare/BCBS Only   Total Timed Codes (min):  8 1:1 Treatment Time:  47       Treatment Area: Neck pain [M54.2]  Spinal stenosis, cervical region [M48.02]    SUBJECTIVE  Pain Level (0-10 scale): 6  Any medication changes, allergies to medications, adverse drug reactions, diagnosis change, or new procedure performed?: [x] No    [] Yes (see summary sheet for update)  Subjective functional status/changes:   [] No changes reported  See POC    OBJECTIVE    8 min [x]Eval                  []Re-Eval     9 min Therapeutic Exercise:  [] See flow sheet : HEP education and performance. Rationale: increase ROM and increase strength to improve the patients ability to perform ADLs. 15 min Self Care/Home Management:  []  See flow sheet : Discussed relevant anatomy and pathology as it relates to POC, treatment options, and importance of regular HEP importance. Discussed use of ice, heat, or TENS for upper thoracic spine pain relief. Discussed importance of posture throughout the day. Rationale: increase ROM and decrease pain  to improve the patients ability to perform ADLs. 15 min Manual Therapy:  STM to cervical paraspinals, scalenes in supine; rhomboids, thoracic paraspinals in prone. DTM to B SCM/levator attachments in supine. Pt prone -- gentle GR1-2 PA oscillations T10 through C7. The manual therapy interventions were performed at a separate and distinct time from the therapeutic activities interventions. Rationale: decrease pain, increase ROM and increase tissue extensibility to improve ease of self care.             With   [x] TE   [] TA   [] neuro   [] other: Patient Education: [x] Review HEP    [] Progressed/Changed HEP based on:   [] positioning   [] body mechanics   [] transfers   [] heat/ice application    [] other:      Other Objective/Functional Measures: see paper eval and DARIN     Pain Level (0-10 scale) post treatment: 6    ASSESSMENT/Changes in Function: Pt is a 61-year-old female presenting to the clinic today with c/o neck and upper thoracic pain described as \"spasms\" and \"feels like a big weight on my shoulders. \" Occasional burning pains are located at the thoracic spine and into the B scapulae just below C7. Pt had a posterior approach cervical fusion of March 27th, 2020 in which she wore a cervical collar for 3 months and a soft collar for 2 months. She previously underwent a fusion with an anterior approach in 2002 and reports currently she is fused from C4-6. Her MD advises her to wear the soft collar intermittently throughout the day to reduce pain. Pt reports she may have had a case of left UE drop shoulder following this past surgery. 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 Current impairments and functional limitations include decreased cervical ROM, decreased flexibility of the neck, decreased scapular strength, impaired ease of ADLs and work tasks, and increased muscular tenderness and tightness of the levator scapulae, scalenes, and rhomboids. Limited segmental mobility noted C7-T5 with very reactive pain to gentle palpation and massage. Patient will benefit from skilled PT services to modify and progress therapeutic interventions, address functional mobility deficits, address ROM deficits, address strength deficits, analyze and address soft tissue restrictions, analyze and cue movement patterns, analyze and modify body mechanics/ergonomics, assess and modify postural abnormalities and instruct in home and community integration to attain remaining goals.      [x]  See Plan of Care  []  See progress note/recertification  []  See Discharge Summary         Progress towards goals / Updated goals:  Short Term Goals: To be accomplished in 2 weeks:  1. Pt will report daily compliance with HEP at least once a day to maximize therapeutic success. Eval: HEP assigned  2. Pt will report no increase in pain following therapy to improve ease and comfort of self care. Eval: tenderness/burning upper thoracic spine following eval  Long Term Goals: To be accomplished in 4 weeks:  1. Pt will improve FOTO to 47, demonstrating improved functional capacity for ADLs. Eval: 34  2. Pt will improve B scapular stability MMT strength to 4+/5 or better to improve ease of functional activities. Eval: 4/5  3. Pt will improve cervical ROM in all planes to 40 degrees or better without pain increase to improve ease of work tasks and driving. Eval: 30 degrees in all planes, ext 25 degrees; Painful stretch with B sidebending and flexion  4. Pt will report 50% improvement in overall symptoms, demonstrating improved QOL. Eval: 0%    PLAN  []  Upgrade activities as tolerated     [x]  Continue plan of care  []  Update interventions per flow sheet       []  Discharge due to:_  []  Other:_      Junior Yin, PT 11/18/2020  7:00 PM    No future appointments.

## 2020-11-20 ENCOUNTER — HOSPITAL ENCOUNTER (OUTPATIENT)
Dept: PHYSICAL THERAPY | Age: 65
Discharge: HOME | End: 2020-11-20
Payer: COMMERCIAL

## 2020-11-20 PROCEDURE — 97140 MANUAL THERAPY 1/> REGIONS: CPT

## 2020-11-20 PROCEDURE — 97110 THERAPEUTIC EXERCISES: CPT

## 2020-11-20 PROCEDURE — 97112 NEUROMUSCULAR REEDUCATION: CPT

## 2020-11-20 NOTE — PROGRESS NOTES
PT DAILY TREATMENT NOTE     Patient Name: Jason Allen  Date:2020  : 1955  [x]  Patient  Verified  Payor: BLUE CROSS / Plan: [facility] PPO / Product Type: PPO /    In time:558  Out time:653  Total Treatment Time (min): 54  Visit #: 2 of 8    Medicare/BCBS Only   Total Timed Codes (min):  45 1:1 Treatment Time:  45       Treatment Area: Neck pain [M54.2]  Spinal stenosis, cervical region [M48.02]    SUBJECTIVE  Pain Level (0-10 scale): 5-6  Any medication changes, allergies to medications, adverse drug reactions, diagnosis change, or new procedure performed?: [x] No    [] Yes (see summary sheet for update)  Subjective functional status/changes:   [] No changes reported  Patient reports she was rushing from another doctors appointment and had a busy day today with increased neck/upper back pain.      OBJECTIVE    Modality rationale: decrease pain and increase tissue extensibility to improve the patients ability to improve overall functional mobility with  Greater ease    Min Type Additional Details   10 [x] Estim:  []Unatt       [x]IFC  []Premod                        []Other:  []w/ice   [x]w/heat  Position: prone  Location: cervical/thoracic junction    [] Estim: []Att    []TENS instruct  []NMES                    []Other:  []w/US   []w/ice   []w/heat  Position:  Location:    []  Traction: [] Cervical       []Lumbar                       [] Prone          []Supine                       []Intermittent   []Continuous Lbs:  [] before manual  [] after manual    []  Ultrasound: []Continuous   [] Pulsed                           []1MHz   []3MHz W/cm2:  Location:    []  Iontophoresis with dexamethasone         Location: [] Take home patch   [] In clinic    []  Ice     []  heat  []  Ice massage  []  Laser   []  Anodyne Position:  Location:    []  Laser with stim  []  Other:  Position:  Location:    []  Vasopneumatic Device Pressure:       [] lo [] med [] hi   Temperature: [] lo [] med [] hi   [x] Skin assessment post-treatment:  [x]intact []redness- no adverse reaction    []redness - adverse  reaction:       20 min Therapeutic Exercise:  [x] See flow sheet :   Rationale: increase ROM, increase strength and improve coordination to improve the patients ability to perform ADLs and self care tasks with greater ease     17 min Neuromuscular Re-education:  [x]  See flow sheet: prone on elbows SA press, prone Is/Ts/Ws, scapular stabilization with theraband   Rationale: increase strength, improve coordination and increase proprioception  to improve the patients ability to perform ADLs and self care tasks with improved stability     8 min Manual Therapy:  Prone - gentle STM to bilateral UT, levator scapulae, rhomboids    The manual therapy interventions were performed at a separate and distinct time from the therapeutic activities interventions. Rationale: decrease pain, increase tissue extensibility and decrease trigger points to perform functional mobility with greater ease and improved tolerance           With   [] TE   [] TA   [] neuro   [] other: Patient Education: [x] Review HEP    [] Progressed/Changed HEP based on:   [] positioning   [] body mechanics   [] transfers   [] heat/ice application    [] other:      Pain Level (0-10 scale) post treatment: 3 \"pressure\"     ASSESSMENT/Changes in Function: Patient presents for first follow up visit since initial evaluation. Exercises initiated per flow sheet. Patient reporting discomfort in right shoulder with UBE today. Patient reported slight dizziness with chin tuck and rotation to right which subsided <1 minute with no residual symptoms. Patient challenged with prone scapular stabilization exercises and reporting increased pain with alternating UE in prone on elbows SA press. Modified exercise to SA press without UE movement with improved tolerance.  Patient with increased hypertonicity noted through bilateral upper trap/levator scapulae, and rhomboids (right > left), however very reactive to gentle manual therapy with slight improvement noted following manual.      Patient will continue to benefit from skilled PT services to modify and progress therapeutic interventions, address functional mobility deficits, address ROM deficits, address strength deficits, analyze and address soft tissue restrictions, analyze and cue movement patterns, analyze and modify body mechanics/ergonomics, assess and modify postural abnormalities, address imbalance/dizziness and instruct in home and community integration to attain remaining goals. [x]  See Plan of Care  []  See progress note/recertification  []  See Discharge Summary         Progress towards goals / Updated goals:  Short Term Goals: To be accomplished in 2 weeks:  1. Pt will report daily compliance with HEP at least once a day to maximize therapeutic success. Eval: HEP assigned  2. Pt will report no increase in pain following therapy to improve ease and comfort of self care. Eval: tenderness/burning upper thoracic spine following eval    Long Term Goals: To be accomplished in 4 weeks:  1. Pt will improve FOTO to 47, demonstrating improved functional capacity for ADLs. Eval: 34  2. Pt will improve B scapular stability MMT strength to 4+/5 or better to improve ease of functional activities. Eval: 4/5  3. Pt will improve cervical ROM in all planes to 40 degrees or better without pain increase to improve ease of work tasks and driving. Eval: 30 degrees in all planes, ext 25 degrees; Painful stretch with B sidebending and flexion  4. Pt will report 50% improvement in overall symptoms, demonstrating improved QOL.                Eval: 0%    PLAN  [x]  Upgrade activities as tolerated     []  Continue plan of care  []  Update interventions per flow sheet       []  Discharge due to:_  []  Other:_      Heather Parham, PT, DPT 11/20/2020  6:03 PM    Future Appointments   Date Time Provider Bandar [place]   12/2/2020  4:30 PM Bethena Matters, PTA [facility]   12/4/2020  6:00 PM Mata Becerraor, PT [facility]   12/9/2020  4:30 PM Bethena Matters, PTA [facility]   12/11/2020  3:45 PM Mata Becerraor, PT [facility]   12/16/2020  4:30 PM Bethena Matters, PTA [facility]   12/18/2020  3:45 PM Bethena Matters, PTA [facility]

## 2020-12-02 ENCOUNTER — APPOINTMENT (OUTPATIENT)
Dept: PHYSICAL THERAPY | Age: 65
End: 2020-12-02
Payer: COMMERCIAL

## 2020-12-04 ENCOUNTER — HOSPITAL ENCOUNTER (OUTPATIENT)
Dept: PHYSICAL THERAPY | Age: 65
Discharge: HOME | End: 2020-12-04
Payer: COMMERCIAL

## 2020-12-04 PROCEDURE — 97014 ELECTRIC STIMULATION THERAPY: CPT

## 2020-12-04 PROCEDURE — 97140 MANUAL THERAPY 1/> REGIONS: CPT

## 2020-12-04 PROCEDURE — 97112 NEUROMUSCULAR REEDUCATION: CPT

## 2020-12-04 PROCEDURE — 97110 THERAPEUTIC EXERCISES: CPT

## 2020-12-04 NOTE — PROGRESS NOTES
PT DAILY TREATMENT NOTE     Patient Name: Juan Landaverde  Date:2020  : 1955  [x]  Patient  Verified  Payor: BLUE CROSS / Plan: [facility] PPO / Product Type: PPO /    In time:533  Out time:634  Total Treatment Time (min): 61  Visit #: 3 of 8    Medicare/BCBS Only   Total Timed Codes (min):  61 1:1 Treatment Time:  45       Treatment Area: Neck pain [M54.2]  Spinal stenosis, cervical region [M48.02]    SUBJECTIVE  Pain Level (0-10 scale): 7  Any medication changes, allergies to medications, adverse drug reactions, diagnosis change, or new procedure performed?: [x] No    [] Yes (see summary sheet for update)  Subjective functional status/changes:   [] No changes reported  Reports a lot of \"pressure\" at the shoulders today. OBJECTIVE    Modality rationale: decrease pain and increase tissue extensibility to improve the patients ability to perform ADLs. Min Type Additional Details   10 []  Ice     [x]  Heat with IFC   []  Ice massage  []  Laser   []  Anodyne Position: prone  Location:cervical/thoracic junction   [] Skin assessment post-treatment:  []intact []redness- no adverse reaction    []redness - adverse reaction:   20  min Therapeutic Exercise:  [x]? See flow sheet :   Rationale: increase ROM, increase strength and improve coordination to improve the patients ability to perform ADLs and self care tasks with greater ease      15  min Neuromuscular Re-education:  [x]? See flow sheet: prone on elbows SA press, prone Is/Ts/Ws, scapular stabilization with theraband   Rationale: increase strength, improve coordination and increase proprioception  to improve the patients ability to perform ADLs and self care tasks with improved stability      10 min Manual Therapy:  Prone - gentle STM to bilateral UT, levator scapulae, rhomboids    The manual therapy interventions were performed at a separate and distinct time from the therapeutic activities interventions.   Rationale: decrease pain, increase tissue extensibility and decrease trigger points to perform functional mobility with greater ease and improved tolerance           With   [] TE   [] TA   [] neuro   [] other: Patient Education: [x] Review HEP    [] Progressed/Changed HEP based on:   [] positioning   [] body mechanics   [] transfers   [] heat/ice application    [] other:      Other Objective/Functional Measures: added thoracic rotation in prone     Pain Level (0-10 scale) post treatment: 5    ASSESSMENT/Changes in Function: Pt performs exercises as directed with discomfort noted with cervical chin tuck series. She reports a slight onset of headache following these exercises. Fatigue and weakness noted with prone scapular strengthening. Continued tenderness to gentle STM and GR1 PA oscillations to the cervical and upper thoracic spine. Educated pt in scar tissue massage and use of household objects to assist with reaching lower scar such as a flexible spatula or the flat end of a butter knife. Pt noted pain decrease to 5/10 following manual therapy with further relief post-ESTIM and heat. Patient will continue to benefit from skilled PT services to modify and progress therapeutic interventions, address functional mobility deficits, address ROM deficits, address strength deficits, analyze and address soft tissue restrictions, analyze and cue movement patterns, analyze and modify body mechanics/ergonomics and assess and modify postural abnormalities to attain remaining goals. []  See Plan of Care  []  See progress note/recertification  []  See Discharge Summary         Progress towards goals / Updated goals:  Short Term Goals: To be accomplished in 2 weeks:  1. Pt will report daily compliance with HEP at least once a day to maximize therapeutic success.              Eval: HEP assigned   Current: met, reports compliance (11/20/2020)  2.  Pt will report no increase in pain following therapy to improve ease and comfort of self care.              Eval: tenderness/burning upper thoracic spine following eval   Current: met, decreased pain following manual and heat (12/4/2020)   Long Term Goals: To be accomplished in 4 weeks:  1. Pt will improve FOTO to 47, demonstrating improved functional capacity for ADLs.             Eval: 34  2. Pt will improve B scapular stability MMT strength to 4+/5 or better to improve ease of functional activities.             Eval: 4/5  3. Pt will improve cervical ROM in all planes to 40 degrees or better without pain increase to improve ease of work tasks and driving.              Eval: 30 degrees in all planes, ext 25 degrees; Painful stretch with B sidebending and flexion  4. Pt will report 50% improvement in overall symptoms, demonstrating improved QOL.                Eval: 0%    PLAN  []  Upgrade activities as tolerated     [x]  Continue plan of care  []  Update interventions per flow sheet       []  Discharge due to:_  []  Other:_      Parmjit Carlson, PT 12/4/2020  5:36 PM    Future Appointments   Date Time Provider Bandar Jacobsen   12/4/2020  6:00 PM Stefano Jaquez, PT [facility]   12/9/2020  4:30 PM Mae Hernandezt, PTA [facility]   12/11/2020  3:45 PM Stefano Jaquez, PT [facility]   12/16/2020  4:30 PM Mae Conner, PTA [facility]   12/18/2020  3:45 PM Mae Conner, PTA [facility]

## 2020-12-09 ENCOUNTER — HOSPITAL ENCOUNTER (OUTPATIENT)
Dept: PHYSICAL THERAPY | Age: 65
Discharge: HOME | End: 2020-12-09
Payer: COMMERCIAL

## 2020-12-09 PROCEDURE — 97112 NEUROMUSCULAR REEDUCATION: CPT

## 2020-12-09 PROCEDURE — 97110 THERAPEUTIC EXERCISES: CPT

## 2020-12-09 PROCEDURE — 97140 MANUAL THERAPY 1/> REGIONS: CPT

## 2020-12-09 NOTE — PROGRESS NOTES
PT DAILY TREATMENT NOTE     Patient Name: Julisa Benitez  Date:2020  : 1955  [x]  Patient  Verified  Payor: BLUE CROSS / Plan: [facility] PPO / Product Type: PPO /    In time:4:30  Out time:5:32  Total Treatment Time (min): 62  Visit #: 4 of 8    Medicare/BCBS Only   Total Timed Codes (min):  52 1:1 Treatment Time:  42       Treatment Area: Neck pain [M54.2]  Spinal stenosis, cervical region [M48.02]    SUBJECTIVE  Pain Level (0-10 scale): 5  Any medication changes, allergies to medications, adverse drug reactions, diagnosis change, or new procedure performed?: [x] No    [] Yes (see summary sheet for update)  Subjective functional status/changes:   [] No changes reported  Pt reports her neck is feeling really \"stiff and heavy\"    OBJECTIVE    Modality rationale: decrease pain and increase tissue extensibility to improve the patients ability to perform ADL's with ease.    Min Type Additional Details   10 [x] Estim:  []Unatt       [x]IFC  []Premod                        []Other:  []w/ice   [x]w/heat  Position:prone  Location: C/S    [] Estim: []Att    []TENS instruct  []NMES                    []Other:  []w/US   []w/ice   []w/heat  Position:  Location:    []  Traction: [] Cervical       []Lumbar                       [] Prone          []Supine                       []Intermittent   []Continuous Lbs:  [] before manual  [] after manual    []  Ultrasound: []Continuous   [] Pulsed                           []1MHz   []3MHz W/cm2:  Location:    []  Iontophoresis with dexamethasone         Location: [] Take home patch   [] In clinic    []  Ice     []  heat  []  Ice massage  []  Laser   []  Anodyne Position:  Location:    []  Laser with stim  []  Other:  Position:  Location:    []  Vasopneumatic Device Pressure:       [] lo [] med [] hi   Temperature: [] lo [] med [] hi   [] Skin assessment post-treatment:  []intact []redness- no adverse reaction    []redness - adverse reaction:       27 min Therapeutic Exercise:  [x] See flow sheet :   Rationale: increase ROM and increase strength to improve the patients ability to perform functional task with ease. 15 min Neuromuscular Re-education:  [x]  See flow sheet :   Rationale: increase strength, improve coordination and increase proprioception  to improve the patients ability to perform ADL's with ease. 10 min Manual Therapy:  Prone - gentle STM to bilateral UT, levator scapulae, rhomboids    The manual therapy interventions were performed at a separate and distinct time from the therapeutic activities interventions. Rationale: decrease pain, increase ROM, increase tissue extensibility and decrease trigger points to improve overall functional mobility. With   [] TE   [] TA   [] neuro   [] other: Patient Education: [x] Review HEP    [] Progressed/Changed HEP based on:   [] positioning   [] body mechanics   [] transfers   [] heat/ice application    [] other:      Other Objective/Functional Measures:      Pain Level (0-10 scale) post treatment: 4    ASSESSMENT/Changes in Function:   Pt continues to be challenged with prone forward reaching however noting a slight decrease in discomfort with positional changes to her head position keeping her c/s in neutral and the ability to tolerate 5 reps ea. Improved tolerance to palpation noted during manual.  Pt reports decreased pain post treatment but continues to note tightness in the c/s. Patient will continue to benefit from skilled PT services to modify and progress therapeutic interventions, address functional mobility deficits, address ROM deficits, address strength deficits, analyze and address soft tissue restrictions, analyze and cue movement patterns, analyze and modify body mechanics/ergonomics and assess and modify postural abnormalities to attain remaining goals.      [x]  See Plan of Care  []  See progress note/recertification  []  See Discharge Summary         Progress towards goals / Updated goals:  Short Term Goals: To be accomplished in 2 weeks:  1. Pt will report daily compliance with HEP at least once a day to maximize therapeutic success.              Eval: HEP assigned              Current: met, reports compliance (11/20/2020)  2. Pt will report no increase in pain following therapy to improve ease and comfort of self care.              Eval: tenderness/burning upper thoracic spine following eval              Current: met, decreased pain following manual and heat (12/4/2020)   Long Term Goals: To be accomplished in 4 weeks:  1. Pt will improve FOTO to 47, demonstrating improved functional capacity for ADLs.             Eval: 34  2. Pt will improve B scapular stability MMT strength to 4+/5 or better to improve ease of functional activities.             Eval: 4/5  3. Pt will improve cervical ROM in all planes to 40 degrees or better without pain increase to improve ease of work tasks and driving.              Eval: 30 degrees in all planes, ext 25 degrees; Painful stretch with B sidebending and flexion  4. Pt will report 50% improvement in overall symptoms, demonstrating improved QOL.                Eval: 0%    PLAN  []  Upgrade activities as tolerated     [x]  Continue plan of care  []  Update interventions per flow sheet       []  Discharge due to:_  []  Other:_      Luis Miguel Pino PTA 12/9/2020  4:57 PM    Future Appointments   Date Time Provider Bandar Jacobsen   12/11/2020  3:45 PM [facility]   12/16/2020  4:30 PM Katharine Fairbanks PTA [facility]   12/18/2020  3:45 PM [facility]

## 2020-12-11 ENCOUNTER — HOSPITAL ENCOUNTER (OUTPATIENT)
Dept: PHYSICAL THERAPY | Age: 65
Discharge: HOME | End: 2020-12-11
Payer: COMMERCIAL

## 2020-12-11 PROCEDURE — 97140 MANUAL THERAPY 1/> REGIONS: CPT

## 2020-12-11 PROCEDURE — 97014 ELECTRIC STIMULATION THERAPY: CPT

## 2020-12-11 PROCEDURE — 97112 NEUROMUSCULAR REEDUCATION: CPT

## 2020-12-11 PROCEDURE — 97110 THERAPEUTIC EXERCISES: CPT

## 2020-12-11 NOTE — PROGRESS NOTES
PT DAILY TREATMENT NOTE     Patient Name: Sammy Andrew  Date:2020  : 1955  [x]  Patient  Verified  Payor: BLUE CROSS / Plan: [facility] PPO / Product Type: PPO /    In time:350  Out time:443  Total Treatment Time (min): 53  Visit #: 5 of 8    Medicare/BCBS Only   Total Timed Codes (min):  53 1:1 Treatment Time:  43       Treatment Area: Neck pain [M54.2]  Spinal stenosis, cervical region [M48.02]    SUBJECTIVE  Pain Level (0-10 scale): 8 \"pressure\" versus pain  Any medication changes, allergies to medications, adverse drug reactions, diagnosis change, or new procedure performed?: [x] No    [] Yes (see summary sheet for update)  Subjective functional status/changes:   [] No changes reported  Pt reports feeling more worn out today. OBJECTIVE    Modality rationale: decrease pain and increase tissue extensibility to improve the patients ability to manage self care   Min Type Additional Details   10 min [x] Estim:  []Unatt       [x]IFC  []Premod                        []Other:  []w/ice   [x]w/heat  Position: prone  Location: cervical-thoracic junction   [] Skin assessment post-treatment:  []intact []redness- no adverse reaction    []redness - adverse reaction:   20 min Therapeutic Exercise:  [x]? See flow sheet :   Rationale: increase ROM and increase strength to improve the patients ability to perform functional task with ease. 15 min Neuromuscular Re-education:  [x]? See flow sheet :   Rationale: increase strength, improve coordination and increase proprioception  to improve the patients ability to perform ADL's with ease.     8 min Manual Therapy:  Prone - gentle STM to bilateral UT, levator scapulae, rhomboids; GR2 oscillations to T5-C6 with scar tissue massage   The manual therapy interventions were performed at a separate and distinct time from the therapeutic activities interventions.   Rationale: decrease pain, increase ROM, increase tissue extensibility and decrease trigger points to improve overall functional mobility. With   [] TE   [] TA   [] neuro   [] other: Patient Education: [x] Review HEP    [] Progressed/Changed HEP based on:   [] positioning   [] body mechanics   [] transfers   [] heat/ice application    [] other:      Other Objective/Functional Measures: exercises per flowsheet     Pain Level (0-10 scale) post treatment: 5    ASSESSMENT/Changes in Function: Pt performs exercises with improved ease, but does express some fatigue following prone exercise series. Has improved toleration to STM and PA oscillations to the thoracocervical junction. Patient will continue to benefit from skilled PT services to modify and progress therapeutic interventions, address functional mobility deficits, address ROM deficits, address strength deficits, analyze and address soft tissue restrictions, analyze and cue movement patterns, analyze and modify body mechanics/ergonomics and assess and modify postural abnormalities to attain remaining goals. [x]  See Plan of Care  []  See progress note/recertification  []  See Discharge Summary         Progress towards goals / Updated goals:  Short Term Goals: To be accomplished in 2 weeks:  1. Pt will report daily compliance with HEP at least once a day to maximize therapeutic success.              Eval: HEP assigned              Current: met, reports compliance (11/20/2020)  2. Pt will report no increase in pain following therapy to improve ease and comfort of self care.              Eval: tenderness/burning upper thoracic spine following eval              Current: met, decreased pain following manual and heat (12/4/2020)   Long Term Goals: To be accomplished in 4 weeks:  1. Pt will improve FOTO to 47, demonstrating improved functional capacity for ADLs.             Eval: 34  2. Pt will improve B scapular stability MMT strength to 4+/5 or better to improve ease of functional activities.             Eval: 4/5  3.  Pt will improve cervical ROM in all planes to 40 degrees or better without pain increase to improve ease of work tasks and driving.              Eval: 30 degrees in all planes, ext 25 degrees; Painful stretch with B sidebending and flexion  4. Pt will report 50% improvement in overall symptoms, demonstrating improved QOL.                Eval: 0%   Current: slow progress, reports has not seen a lot of change so far and doesn't know if she needs to change her pillow (12/11/2020)    PLAN  []  Upgrade activities as tolerated     [x]  Continue plan of care  []  Update interventions per flow sheet       []  Discharge due to:_  []  Other:_      Bret Serna, PT 12/11/2020  3:57 PM    Future Appointments   Date Time Provider Bandar Jacobsen   12/16/2020  4:30 PM Homa De Souza PTA [facility]   12/18/2020  3:45 PM Homa De Souza PTA [facility]

## 2020-12-16 ENCOUNTER — HOSPITAL ENCOUNTER (OUTPATIENT)
Dept: PHYSICAL THERAPY | Age: 65
Discharge: HOME | End: 2020-12-16
Payer: COMMERCIAL

## 2020-12-16 PROCEDURE — 97112 NEUROMUSCULAR REEDUCATION: CPT

## 2020-12-16 PROCEDURE — 97110 THERAPEUTIC EXERCISES: CPT

## 2020-12-16 PROCEDURE — 97140 MANUAL THERAPY 1/> REGIONS: CPT

## 2020-12-16 PROCEDURE — 97014 ELECTRIC STIMULATION THERAPY: CPT

## 2020-12-16 NOTE — PROGRESS NOTES
PT DAILY TREATMENT NOTE     Patient Name: Josselin Wallace  Date:2020  : 1955  [x]  Patient  Verified  Payor: BLUE CROSS / Plan: [facility] PPO / Product Type: PPO /    In time:515  Out time:610  Total Treatment Time (min): 54  Visit #: 6 of 8    Medicare/BCBS Only   Total Timed Codes (min):  55 1:1 Treatment Time:  45       Treatment Area: Neck pain [M54.2]  Spinal stenosis, cervical region [M48.02]    SUBJECTIVE  Pain Level (0-10 scale): 7 \"pressure\"  Any medication changes, allergies to medications, adverse drug reactions, diagnosis change, or new procedure performed?: [x] No    [] Yes (see summary sheet for update)  Subjective functional status/changes:   [] No changes reported  Reports feels very tired. OBJECTIVE    Modality rationale: decrease pain and increase tissue extensibility to improve the patients ability to perform ADLs. Min Type Additional Details   10 [x] Estim:  [x]Unatt       [x]IFC  []Premod                        []Other:  []w/ice   [x]w/heat  Position: prone  Location: cervicothoracic junction   [] Skin assessment post-treatment:  []intact []redness- no adverse reaction    []redness - adverse reaction:      20 min Therapeutic Exercise:  [x] See flow sheet :   Rationale: increase ROM, increase strength and improve coordination to improve the patients ability to manage ADLs. 15 min Neuromuscular Re-education:  [x]  See flow sheet :    Rationale: increase strength, improve coordination and increase proprioception  to improve the patients ability to perform functional activities with improved ease. 8 min Manual Therapy:  Prone - gentle STM to bilateral UT, levator scapulae, rhomboids; GR2 oscillations to T5-C6 with scar tissue massage   The manual therapy interventions were performed at a separate and distinct time from the therapeutic activities interventions.   Rationale: decrease pain, increase ROM and increase tissue extensibility to improve ease of self care. With   [] TE   [] TA   [] neuro   [] other: Patient Education: [x] Review HEP    [] Progressed/Changed HEP based on:   [] positioning   [] body mechanics   [] transfers   [] heat/ice application    [] other:      Other Objective/Functional Measures: see PN     Pain Level (0-10 scale) post treatment: 7 \"pressure\"    ASSESSMENT/Changes in Function: Pt has been making slow progress since her SOC, with a noticeable improvement in toleration to soft tissue massage and gentle GR2 posterior to anterior oscillations of T1-4. Continues to feel increased \"pressure\" along these dermatomes. Possible keloid formation at the distal scar that pt report \"burns\" with gentle pressure and massage. Slight improvement in ROM noted today. Patient will continue to benefit from skilled PT services to modify and progress therapeutic interventions, address functional mobility deficits, address ROM deficits, address strength deficits, analyze and address soft tissue restrictions, analyze and cue movement patterns, analyze and modify body mechanics/ergonomics and assess and modify postural abnormalities to attain remaining goals. [x]  See Plan of Care  []  See progress note/recertification  []  See Discharge Summary         Progress towards goals / Updated goals:  Short Term Goals: To be accomplished in 2 weeks:  1. Pt will report daily compliance with HEP at least once a day to maximize therapeutic success.              Eval: HEP assigned              Current: met, reports compliance (11/20/2020)  2. Pt will report no increase in pain following therapy to improve ease and comfort of self care.              Eval: tenderness/burning upper thoracic spine following eval              Current: met, decreased pain following manual and heat (12/4/2020)   Long Term Goals: To be accomplished in 4 weeks:  1. Pt will improve FOTO to 47, demonstrating improved functional capacity for ADLs.               Eval: 34   Current: progressing, obtain measure at next visit. (12/16/2020)  2. Pt will improve B scapular stability MMT strength to 4+/5 or better to improve ease of functional activities.             Eval: 4/5   Current: remains, grossly 4/5 B (12/16/2020)  3. Pt will improve cervical ROM in all planes to 40 degrees or better without pain increase to improve ease of work tasks and driving.              Eval: 30 degrees in all planes, ext 25 degrees; Painful stretch with B sidebending and flexion   Current: 30 degrees ext, 20 degrees ext,  40 degrees rotation B, 30 degrees sidebending \"straining\" (12/16/2020)  4. Pt will report 50% improvement in overall symptoms, demonstrating improved QOL.                Eval: 0%              Current: slow progress, reports has not seen a lot of change so far and doesn't know if she needs to change her pillow (12/11/2020)    PLAN  [x]  Upgrade activities as tolerated     [x]  Continue plan of care  []  Update interventions per flow sheet       []  Discharge due to:_  []  Other:_      Fredi Herron, PT 12/16/2020  5:39 PM    Future Appointments   Date Time Provider Bandar Jacobsen   12/18/2020  5:15 PM Ressie Fee, PT [facility]

## 2020-12-17 NOTE — PROGRESS NOTES
[facility] [address] [facility], [facility] [address]  [phone] fax    Physical Therapy Progress Note  Patient name: David Severino Start of Care: 2020   Referral source: Keven Valdez MD : 1955   Medical/Treatment Diagnosis: Neck pain [M54.2]  Spinal stenosis, cervical region [M48.02]  Payor: Brandie Valencia / Plan: Tomeka Mtz [id number] PPO / Product Type: PPO /  Onset Date:cervical fusion 3/27/2020     Prior Hospitalization: see medical history Provider#: [id number]   Medications: Verified on Patient Summary List    Comorbidities: cervical fusion C4-6, arthritis, back pain, high blood pressure, osteoporosis  Prior Level of Function:manages ADLs and self care tasks with neck pain and B UE radiculopathy  Visits from Start of Care: 6    Missed Visits: 1      Established Goals:        Excellent         Good         Limited            None  [x] Increased ROM   []  [x]  []  []  [x] Increased Strength  []  []  [x]  []  [x] Increased Mobility  []  []  [x]  []   [x] Decreased Pain   []  []  [x]  []  [x] Decreased Swelling  []  []  [x]  []    Key Functional Changes: improved B cervical rotation to 40 degrees    Updated Goals: to be achieved in 4 weeks:  1. Pt will improve FOTO to 47, demonstrating improved functional capacity for ADLs. Re-cert: progressing, obtain measure at next visit. 2. Pt will improve B scapular stability MMT strength to 4+/5 or better to improve ease of functional activities. Re-cert: remains, grossly 4/5 B    3. Pt will improve cervical ROM in all planes to 40 degrees or better without pain increase to improve ease of work tasks and driving. Re-cert: progressing, 30 degrees ext, 20 degrees ext,  40 degrees rotation B, 30 degrees sidebending \"straining\"   4. Pt will report 50% improvement in overall symptoms, demonstrating improved QOL.                Re-cert: slow progress, reports has not seen a lot of change so far and doesn't know if she needs to change her pillow         ASSESSMENT/RECOMMENDATIONS:  Pt has been making slow progress since her SOC, with a noticeable improvement in toleration to soft tissue massage and gentle GR2 posterior to anterior oscillations of T1-4. Continues to feel increased \"pressure\" along these dermatomes. Possible keloid formation at the distal scar that pt report \"burns\" with gentle pressure and massage. Slight improvement in ROM noted today.     Patient will continue to benefit from skilled PT services to modify and progress therapeutic interventions, address functional mobility deficits, address ROM deficits, address strength deficits, analyze and address soft tissue restrictions, analyze and cue movement patterns, analyze and modify body mechanics/ergonomics and assess and modify postural abnormalities to attain remaining goals. [x]Continue therapy per initial plan/protocol at a frequency of  1-2 x per week for 4 weeks  []Continue therapy with the following recommended changes:_____________________      _____________________________________________________________________  []Discontinue therapy progressing towards or have reached established goals  []Discontinue therapy due to lack of appreciable progress towards goals  []Discontinue therapy due to lack of attendance or compliance  []Await Physician's recommendations/decisions regarding therapy  []Other:________________________________________________________________    Thank you for this referral.    Chari Treviño, PT 12/16/2020 7:10 PM  NOTE TO PHYSICIAN:  PLEASE COMPLETE THE ORDERS BELOW AND   FAX TO [facility]: [phone]  If you are unable to process this request in 24 hours please contact our office: [phone]    ? I have read the above report and request that my patient continue as recommended.   ? I have read the above report and request that my patient continue therapy with the following changes/special instructions:__________________________________________________________  ? I have read the above report and request that my patient be discharged from therapy.     Physicians signature: ______________________________Date: ______Time:______

## 2020-12-18 ENCOUNTER — APPOINTMENT (OUTPATIENT)
Dept: PHYSICAL THERAPY | Age: 65
End: 2020-12-18
Payer: COMMERCIAL

## 2020-12-22 ENCOUNTER — HOSPITAL ENCOUNTER (OUTPATIENT)
Dept: PHYSICAL THERAPY | Age: 65
Discharge: HOME | End: 2020-12-22
Payer: COMMERCIAL

## 2020-12-22 PROCEDURE — 97110 THERAPEUTIC EXERCISES: CPT

## 2020-12-22 PROCEDURE — 97112 NEUROMUSCULAR REEDUCATION: CPT

## 2020-12-22 PROCEDURE — 97140 MANUAL THERAPY 1/> REGIONS: CPT

## 2020-12-22 NOTE — PROGRESS NOTES
PT DAILY TREATMENT NOTE     Patient Name: Juan Landaverde  Date:2020  : 1955  [x]  Patient  Verified  Payor: BLUE CROSS / Plan: [facility] PPO / Product Type: PPO /    In time:5:13  Out time:6:12  Total Treatment Time (min): 61  Visit #: 1 of 4-8    Medicare/BCBS Only   Total Timed Codes (min):  49 1:1 Treatment Time:  49       Treatment Area: Neck pain [M54.2]  Spinal stenosis, cervical region [M48.02]    SUBJECTIVE  Pain Level (0-10 scale): 4  Any medication changes, allergies to medications, adverse drug reactions, diagnosis change, or new procedure performed?: [x] No    [] Yes (see summary sheet for update)  Subjective functional status/changes:   [] No changes reported  \"today is a good day\"    OBJECTIVE    Modality rationale: decrease pain and increase tissue extensibility to improve the patients ability to preform functional task with ease.    Min Type Additional Details   10 [x] Estim:  []Unatt       [x]IFC  []Premod                        []Other:  []w/ice   [x]w/heat  Position:seated  Location: C/S    [] Estim: []Att    []TENS instruct  []NMES                    []Other:  []w/US   []w/ice   []w/heat  Position:  Location:    []  Traction: [] Cervical       []Lumbar                       [] Prone          []Supine                       []Intermittent   []Continuous Lbs:  [] before manual  [] after manual    []  Ultrasound: []Continuous   [] Pulsed                           []1MHz   []3MHz W/cm2:  Location:    []  Iontophoresis with dexamethasone         Location: [] Take home patch   [] In clinic    []  Ice     []  heat  []  Ice massage  []  Laser   []  Anodyne Position:  Location:    []  Laser with stim  []  Other:  Position:  Location:    []  Vasopneumatic Device Pressure:       [] lo [] med [] hi   Temperature: [] lo [] med [] hi   [] Skin assessment post-treatment:  []intact []redness- no adverse reaction    []redness - adverse reaction:       26 min Therapeutic Exercise:  [x] See flow sheet :   Rationale: increase ROM and increase strength to improve the patients ability to perform functional task with ease. 15 min Neuromuscular Re-education:  [x]  See flow sheet :   Rationale: increase strength, improve coordination and increase proprioception  to improve the patients ability to perform     8 min Manual Therapy:  Prone - gentle STM to bilateral UT, levator scapulae, rhomboids; GR2 oscillations to T5-C6 with scar tissue massage   The manual therapy interventions were performed at a separate and distinct time from the therapeutic activities interventions. Rationale: decrease pain, increase ROM, increase tissue extensibility and decrease trigger points to improve functional mobility with ADL's. With   [] TE   [] TA   [] neuro   [] other: Patient Education: [x] Review HEP    [] Progressed/Changed HEP based on:   [] positioning   [] body mechanics   [] transfers   [] heat/ice application    [] other:      Other Objective/Functional Measures:      Pain Level (0-10 scale) post treatment: 4    ASSESSMENT/Changes in Function:   Pt displays improved tolerance to Manual noting decreased sensitivity to palpation. Pt displays improved functional mobility and ease with exercises. Patient will continue to benefit from skilled PT services to modify and progress therapeutic interventions, address functional mobility deficits, address ROM deficits, address strength deficits, analyze and address soft tissue restrictions, analyze and cue movement patterns, analyze and modify body mechanics/ergonomics and assess and modify postural abnormalities to attain remaining goals. [x]  See Plan of Care  []  See progress note/recertification  []  See Discharge Summary         Progress towards goals / Updated goals:  Updated Goals: to be achieved in 4 weeks:  1. Pt will improve FOTO to 47, demonstrating improved functional capacity for ADLs.               Re-cert: progressing, obtain measure at next visit. 2. Pt will improve B scapular stability MMT strength to 4+/5 or better to improve ease of functional activities.             Re-cert: remains, grossly 4/5 B    3. Pt will improve cervical ROM in all planes to 40 degrees or better without pain increase to improve ease of work tasks and driving.              Re-cert: progressing, 30 degrees ext, 20 degrees ext,  40 degrees rotation B, 30 degrees sidebending \"straining\"   4. Pt will report 50% improvement in overall symptoms, demonstrating improved QOL.                Re-cert: slow progress, reports has not seen a lot of change so far and doesn't know if she needs to change her pillow      PLAN  []  Upgrade activities as tolerated     [x]  Continue plan of care  []  Update interventions per flow sheet       []  Discharge due to:_  []  Other:_      Hannah Guzman PTA 12/22/2020  5:17 PM    Future Appointments   Date Time Provider Bandar Jacobsen   12/29/2020  3:00 PM Mae [employer], PTA [facility]   12/31/2020  3:30 PM Lonza Petite [facility]   1/4/2021  6:00 PM Laurey [employer], PTA [facility]   1/6/2021  5:15 PM Stefano Jaquez, PT [facility]   1/11/2021  6:00 PM Mae [employer], PTA [facility]   1/13/2021  6:00 PM Romelia Arellano [facility]

## 2020-12-23 ENCOUNTER — APPOINTMENT (OUTPATIENT)
Dept: PHYSICAL THERAPY | Age: 65
End: 2020-12-23
Payer: COMMERCIAL

## 2020-12-29 ENCOUNTER — HOSPITAL ENCOUNTER (OUTPATIENT)
Dept: PHYSICAL THERAPY | Age: 65
Discharge: HOME | End: 2020-12-29
Payer: COMMERCIAL

## 2020-12-29 PROCEDURE — 97112 NEUROMUSCULAR REEDUCATION: CPT

## 2020-12-29 PROCEDURE — 97140 MANUAL THERAPY 1/> REGIONS: CPT

## 2020-12-29 PROCEDURE — 97110 THERAPEUTIC EXERCISES: CPT

## 2020-12-29 NOTE — PROGRESS NOTES
PT DAILY TREATMENT NOTE     Patient Name: Harris Hidden  Date:2020  : 1955  [x]  Patient  Verified  Payor: BLUE CROSS / Plan: [facility] PPO / Product Type: PPO /    In time:3:02  Out time:3:59  Total Treatment Time (min): 62  Visit #: 2 of 4-8    Medicare/BCBS Only   Total Timed Codes (min):  47 1:1 Treatment Time:  47       Treatment Area: Neck pain [M54.2]  Spinal stenosis, cervical region [M48.02]    SUBJECTIVE  Pain Level (0-10 scale): 4  Any medication changes, allergies to medications, adverse drug reactions, diagnosis change, or new procedure performed?: [x] No    [] Yes (see summary sheet for update)  Subjective functional status/changes:   [] No changes reported  Pt reports she had the worst pain she has felt in a while after leaving her last session reporting she had to take pain med's to ease her discomfort. Pt reports heaviness and burning in the left side of the neck and UT coming in today. OBJECTIVE    Modality rationale: decrease pain and increase tissue extensibility to improve the patients ability to perform ADL's with ease.    Min Type Additional Details   10 [x] Estim:  []Unatt       [x]IFC  []Premod                        []Other:  []w/ice   [x]w/heat  Position:seated  Location: C/S    [x] Estim: []Att    []TENS instruct  []NMES                    []Other:  []w/US   []w/ice   [x]w/heat  Position:  Location:     []  Traction: [] Cervical       []Lumbar                       [] Prone          []Supine                       []Intermittent   []Continuous Lbs:  [] before manual  [] after manual    []  Ultrasound: []Continuous   [] Pulsed                           []1MHz   []3MHz W/cm2:  Location:    []  Iontophoresis with dexamethasone         Location: [] Take home patch   [] In clinic    []  Ice     []  heat  []  Ice massage  []  Laser   []  Anodyne Position:  Location:    []  Laser with stim  []  Other:  Position:  Location:    []  Vasopneumatic Device Pressure:       [] lo [] med [] hi   Temperature: [] lo [] med [] hi   [] Skin assessment post-treatment:  []intact []redness- no adverse reaction    []redness - adverse reaction:       25 min Therapeutic Exercise:  [x] See flow sheet :   Rationale: increase ROM and increase strength to improve the patients ability to perform ADL's with ease. 12 min Neuromuscular Re-education:  [x]  See flow sheet :   Rationale: increase strength, improve coordination and increase proprioception  to improve the patients ability to tolerate ADL's. 10 min Manual Therapy:  Seated- gentle STM to bilateral UT, levator scapulae, rhomboids; GR2 oscillations to T5-C6 with scar tissue massage   The manual therapy interventions were performed at a separate and distinct time from the therapeutic activities interventions. Rationale: decrease pain, increase ROM, increase tissue extensibility and decrease trigger points to improve functional mobility with ADL's. With   [] TE   [] TA   [] neuro   [] other: Patient Education: [x] Review HEP    [] Progressed/Changed HEP based on:   [] positioning   [] body mechanics   [] transfers   [] heat/ice application    [] other:      Other Objective/Functional Measures:      Pain Level (0-10 scale) post treatment: 4    ASSESSMENT/Changes in Function:   No significant changes to therex this visit due to the increased amount of pain pt felt in her c/s from last session. Pt tolerated therex well this visit reporting no increased pain with exercises. Some continued mm restrictions and some TTP at the distal incision however pt continues to display an improved tolerance for manual treatment. Reassess pt's tolerance to treatment NV.     Patient will continue to benefit from skilled PT services to modify and progress therapeutic interventions, address functional mobility deficits, address ROM deficits, address strength deficits, analyze and address soft tissue restrictions, analyze and cue movement patterns, analyze and modify body mechanics/ergonomics and assess and modify postural abnormalities to attain remaining goals. [x]  See Plan of Care  []  See progress note/recertification  []  See Discharge Summary         Progress towards goals / Updated goals:  Updated Goals: to be achieved in 4 weeks:  1. Pt will improve FOTO to 47, demonstrating improved functional capacity for ADLs.             Re-cert: progressing, obtain measure at next visit. 2. Pt will improve B scapular stability MMT strength to 4+/5 or better to improve ease of functional activities.             Re-cert: remains, grossly 4/5 B    3. Pt will improve cervical ROM in all planes to 40 degrees or better without pain increase to improve ease of work tasks and driving.              YY-NJIM: BSOPFZOVLDR, 22 degrees ext, 20 degrees ext,  40 degrees rotation B, 30 degrees sidebending \"straining\"   4. Pt will report 50% improvement in overall symptoms, demonstrating improved QOL.                Re-cert: slow progress, reports has not seen a lot of change so far and doesn't know if she needs to change her pillow      PLAN  []  Upgrade activities as tolerated     [x]  Continue plan of care  []  Update interventions per flow sheet       []  Discharge due to:_  []  Other:_      Joey Bobo, PTA 12/29/2020  3:10 PM    Future Appointments   Date Time Provider Bandar Jacobsen   12/31/2020  3:30 PM Karina Abernathy [facility]   1/4/2021  6:00 PM Michel Posey, PTA [facility]   1/6/2021  5:15 PM Abigail Solares, PT [facility]   1/11/2021  6:00 PM Michel Posey PTA [facility]   1/13/2021  6:00 PM Angelique Thomson [facility]

## 2020-12-31 ENCOUNTER — HOSPITAL ENCOUNTER (OUTPATIENT)
Dept: PHYSICAL THERAPY | Age: 65
Discharge: HOME | End: 2020-12-31
Payer: COMMERCIAL

## 2020-12-31 PROCEDURE — 97110 THERAPEUTIC EXERCISES: CPT

## 2020-12-31 PROCEDURE — 97140 MANUAL THERAPY 1/> REGIONS: CPT

## 2020-12-31 PROCEDURE — 97112 NEUROMUSCULAR REEDUCATION: CPT

## 2020-12-31 NOTE — PROGRESS NOTES
PT DAILY TREATMENT NOTE     Patient Name: Myriam Hansen  Date:2020  : 1955  [x]  Patient  Verified  Payor: BLUE CROSS / Plan: [facility] PPO / Product Type: PPO /    In time: 245  Out time:345  Total Treatment Time (min): 60  Visit #: 3 of 4-8    Medicare/BCBS Only   Total Timed Codes (min):  50 1:1 Treatment Time:  50       Treatment Area: Neck pain [M54.2]  Spinal stenosis, cervical region [M48.02]    SUBJECTIVE  Pain Level (0-10 scale): 5  Any medication changes, allergies to medications, adverse drug reactions, diagnosis change, or new procedure performed?: [x] No    [] Yes (see summary sheet for update)  Subjective functional status/changes:   [] No changes reported  Patient reports she was in Soosalu lot of pain\" last visit requiring her to take pain medication     OBJECTIVE    Modality rationale: decrease pain and increase tissue extensibility to improve the patients ability to functional tasks with greater ease    Min Type Additional Details   10 [x] Estim:  [x]Unatt       [x]IFC  []Premod                        []Other:  []w/ice   [x]w/heat  Position: sitting   Location: cervical/UT    [] Estim: []Att    []TENS instruct  []NMES                    []Other:  []w/US   []w/ice   []w/heat  Position:  Location:    []  Traction: [] Cervical       []Lumbar                       [] Prone          []Supine                       []Intermittent   []Continuous Lbs:  [] before manual  [] after manual    []  Ultrasound: []Continuous   [] Pulsed                           []1MHz   []3MHz W/cm2:  Location:    []  Iontophoresis with dexamethasone         Location: [] Take home patch   [] In clinic    []  Ice     []  heat  []  Ice massage  []  Laser   []  Anodyne Position:  Location:    []  Laser with stim  []  Other:  Position:  Location:    []  Vasopneumatic Device Pressure:       [] lo [] med [] hi   Temperature: [] lo [] med [] hi   [x] Skin assessment post-treatment:  [x]intact []redness- no adverse reaction    []redness - adverse reaction:     30 min Therapeutic Exercise:  [x] See flow sheet :   Rationale: increase ROM, increase strength and improve coordination to improve the patients ability to perform ADLs and self care tasks with greater ease      12 min Neuromuscular Re-education:  [x]  See flow sheet: prone scapular stabilization exercises, SA press in MELONY   Rationale: increase strength, improve coordination and increase proprioception  to improve the patients ability to perform functional tasks with greater stabilization     8 min Manual Therapy:  Sitting - gentle STM to bilateral UT and rhomboids   The manual therapy interventions were performed at a separate and distinct time from the therapeutic activities interventions. Rationale: decrease pain and increase tissue extensibility to perform functional tasks with greater ease        With   [] TE   [] TA   [] neuro   [] other: Patient Education: [x] Review HEP    [] Progressed/Changed HEP based on:   [] positioning   [] body mechanics   [] transfers   [] heat/ice application    [] other:      Other Objective/Functional Measures: cervical flexion 25 degrees, cervical extension 18 degrees, right rotation 50 degrees, left rotation 40 degrees, right sidebend 22 degrees, left sidebend 14 degrees     Pain Level (0-10 scale) post treatment: 5    ASSESSMENT/Changes in Function: Patient reporting increased \"burning\" sensation with SA press while prone on elbows. Instructed patient to perform chin tuck to improve cervical alignment. Patient reporting no \"burning\" sensation with correction of form. Patient education provided on performing cervical AROM to assist with stiffness/tightness throughout the day. Patient also encouraged to perform gentle chin tuck with driving as she reports burning sensation increases with that activity.      Patient will continue to benefit from skilled PT services to modify and progress therapeutic interventions, address functional mobility deficits, address ROM deficits, address strength deficits, analyze and address soft tissue restrictions, analyze and cue movement patterns, analyze and modify body mechanics/ergonomics, assess and modify postural abnormalities, address imbalance/dizziness and instruct in home and community integration to attain remaining goals. [x]  See Plan of Care  []  See progress note/recertification  []  See Discharge Summary         Progress towards goals / Updated goals:  Updated Goals: to be achieved in 4 weeks:  1. Pt will improve FOTO to 47, demonstrating improved functional capacity for ADLs.             Re-cert: progressing, obtain measure at next visit. 2. Pt will improve B scapular stability MMT strength to 4+/5 or better to improve ease of functional activities.             Re-cert: remains, grossly 4/5 B    3. Pt will improve cervical ROM in all planes to 40 degrees or better without pain increase to improve ease of work tasks and driving.              FE-OZHH: DKELTKSKOAH, 90 degrees ext, 20 degrees ext,  40 degrees rotation B, 30 degrees sidebending \"straining\"    Current: 12/31/2020 - cervical flexion 25 degrees, cervical extension 18 degrees, right rotation 50 degrees, left rotation 40 degrees, right sidebend 22 degrees, left sidebend 14 degrees   4. Pt will report 50% improvement in overall symptoms, demonstrating improved QOL.                Re-cert: slow progress, reports has not seen a lot of change so far and doesn't know if she needs to change her pillow      PLAN  [x]  Upgrade activities as tolerated     []  Continue plan of care  []  Update interventions per flow sheet       []  Discharge due to:_  []  Other:_      Sheldon Aldrich, PT, DPT 12/31/2020  3:00 PM    Future Appointments   Date Time Provider Bandar Jacobsen   1/4/2021  6:00 PM [facility]   1/6/2021  5:15 PM Norris Post[facility]   1/11/2021  6:00 PM Jesus Washington [facility] Putnam[facility]   1/13/2021  6:00 PM [facility]evieve [facility]

## 2021-01-04 ENCOUNTER — HOSPITAL ENCOUNTER (OUTPATIENT)
Dept: PHYSICAL THERAPY | Age: 66
Discharge: HOME | End: 2021-01-04
Payer: COMMERCIAL

## 2021-01-04 PROCEDURE — 97112 NEUROMUSCULAR REEDUCATION: CPT

## 2021-01-04 PROCEDURE — 97110 THERAPEUTIC EXERCISES: CPT

## 2021-01-04 PROCEDURE — 97140 MANUAL THERAPY 1/> REGIONS: CPT

## 2021-01-04 NOTE — PROGRESS NOTES
PT DAILY TREATMENT NOTE     Patient Name: Heri Torrez  Date:2021  : 1955  [x]  Patient  Verified  Payor: BLUE CROSS / Plan: [facility] PPO / Product Type: PPO /    In time:6:07 Out time: 7:00  Total Treatment Time (min): 48  Visit #: 4 of 4-8    Medicare/BCBS Only   Total Timed Codes (min):  43 1:1 Treatment Time:  43       Treatment Area: Neck pain [M54.2]  Spinal stenosis, cervical region [M48.02]    SUBJECTIVE  Pain Level (0-10 scale): 4  Any medication changes, allergies to medications, adverse drug reactions, diagnosis change, or new procedure performed?: [x] No    [] Yes (see summary sheet for update)  Subjective functional status/changes:   [] No changes reported  Pt reports she had a lot of pain in her neck following her last visit but it has calmed down since then. OBJECTIVE    Modality rationale: decrease pain and increase tissue extensibility to improve the patients ability to perform ADL's with ease.    Min Type Additional Details   10 [x] Estim:  [x]Unatt       []IFC  [x]Premod                        []Other:  []w/ice   [x]w/heat  Position: sitting  Location: Cervical/UT    [] Estim: []Att    []TENS instruct  []NMES                    []Other:  []w/US   []w/ice   []w/heat  Position:  Location:    []  Traction: [] Cervical       []Lumbar                       [] Prone          []Supine                       []Intermittent   []Continuous Lbs:  [] before manual  [] after manual    []  Ultrasound: []Continuous   [] Pulsed                           []1MHz   []3MHz W/cm2:  Location:    []  Iontophoresis with dexamethasone         Location: [] Take home patch   [] In clinic    []  Ice     []  heat  []  Ice massage  []  Laser   []  Anodyne Position:  Location:    []  Laser with stim  []  Other:  Position:  Location:    []  Vasopneumatic Device Pressure:       [] lo [] med [] hi   Temperature: [] lo [] med [] hi   [] Skin assessment post-treatment:  []intact []redness- no adverse reaction    []redness - adverse reaction:       25 min Therapeutic Exercise:  [x] See flow sheet :   Rationale: increase ROM and increase strength to improve the patients ability to perform daily task with ease. 10 min Neuromuscular Re-education:  [x]  See flow sheet :   Rationale: increase strength, improve coordination and increase proprioception  to improve the patients ability to tolerate ADL's.    8 min Manual Therapy:  Sitting - gentle STM to bilateral UT and rhomboids   The manual therapy interventions were performed at a separate and distinct time from the therapeutic activities interventions. Rationale: decrease pain, increase ROM, increase tissue extensibility and decrease trigger points to improve functional mobility with ADL's. With   [] TE   [] TA   [] neuro   [] other: Patient Education: [x] Review HEP    [] Progressed/Changed HEP based on:   [] positioning   [] body mechanics   [] transfers   [] heat/ice application    [] other:      Other Objective/Functional Measures:      Pain Level (0-10 scale) post treatment: 4    ASSESSMENT/Changes in Function:   Poor carryover of decreased pain in the c/s outside of clinic with pt reporting her pain seems to increase more following her therapy treatments. Pt reports she has started to perform some cervical AROM exercises when she is feeling stiff but has not noticed a significant difference yet. Pt reports some improvements in her tolerance to palpation around her scar but continues to report little to no change in pain, burning and heaviness. Pt reports she is willing to finish out her appointments and then would like to be referred back to her MD for a f/u about what her next steps should be due to her lack of progress.      Patient will continue to benefit from skilled PT services to modify and progress therapeutic interventions, address functional mobility deficits, address ROM deficits, address strength deficits, analyze and address soft tissue restrictions, analyze and cue movement patterns, analyze and modify body mechanics/ergonomics and assess and modify postural abnormalities to attain remaining goals. [x]  See Plan of Care  []  See progress note/recertification  []  See Discharge Summary         Progress towards goals / Updated goals:  Updated Goals: to be achieved in 4 weeks:  1. Pt will improve FOTO to 47, demonstrating improved functional capacity for ADLs.             Re-cert: progressing, obtain measure at next visit. 2. Pt will improve B scapular stability MMT strength to 4+/5 or better to improve ease of functional activities.             Re-cert: remains, grossly 4/5 B    3. Pt will improve cervical ROM in all planes to 40 degrees or better without pain increase to improve ease of work tasks and driving.              YC-PVPA: [id number], 07 degrees ext, 20 degrees ext,  40 degrees rotation B, 30 degrees sidebending \"straining\"               Current: 12/31/2020 - cervical flexion 25 degrees, cervical extension 18 degrees, right rotation 50 degrees, left rotation 40 degrees, right sidebend 22 degrees, left sidebend 14 degrees   4. Pt will report 50% improvement in overall symptoms, demonstrating improved QOL.                Re-cert: slow progress, reports has not seen a lot of change so far and doesn't know if she needs to change her pillow     Current: no change 1/4/20 Pt reports no changes with increased pain at night and heaviness in the c/s.     PLAN  []  Upgrade activities as tolerated     [x]  Continue plan of care  []  Update interventions per flow sheet       []  Discharge due to:_  []  Other:_      Rodolfo Schaffer PTA 1/4/2021  6:06 PM    Future Appointments   Date Time Provider Bandar Jacobsen   1/6/2021  5:15 PM Salina Ledbetter, PT [facility]   1/11/2021  6:00 PM Debby Escudero, PTA [facility]   1/13/2021  6:00 PM Brooks Wilson [facility]

## 2021-01-06 ENCOUNTER — APPOINTMENT (OUTPATIENT)
Dept: PHYSICAL THERAPY | Age: 66
End: 2021-01-06
Payer: COMMERCIAL

## 2021-01-11 ENCOUNTER — HOSPITAL ENCOUNTER (OUTPATIENT)
Dept: PHYSICAL THERAPY | Age: 66
Discharge: HOME | End: 2021-01-11
Payer: COMMERCIAL

## 2021-01-11 PROCEDURE — 97140 MANUAL THERAPY 1/> REGIONS: CPT

## 2021-01-11 PROCEDURE — 97110 THERAPEUTIC EXERCISES: CPT

## 2021-01-11 PROCEDURE — 97112 NEUROMUSCULAR REEDUCATION: CPT

## 2021-01-11 NOTE — PROGRESS NOTES
PT DAILY TREATMENT NOTE     Patient Name: Latisha Noguera  Date:2021  : 1955  [x]  Patient  Verified  Payor: BLUE CROSS / Plan: [id number] PPO / Product Type: PPO /    In time:6:00  Out time:7:00  Total Treatment Time (min): 60  Visit #: 5 of 8    Medicare/BCBS Only   Total Timed Codes (min):  50 1:1 Treatment Time:  50       Treatment Area: Neck pain [M54.2]  Spinal stenosis, cervical region [M48.02]    SUBJECTIVE  Pain Level (0-10 scale): 0  Any medication changes, allergies to medications, adverse drug reactions, diagnosis change, or new procedure performed?: [x] No    [] Yes (see summary sheet for update)  Subjective functional status/changes:   [] No changes reported  Pt reports she is still feeling a lot of pressure and heaviness in her neck and upper back but not a lot of pain.     OBJECTIVE    Modality rationale: decrease pain and increase tissue extensibility to improve the patients ability to improve functional mobility with    Min Type Additional Details   10 [x] Estim:  [x]Unatt       []IFC  []Premod                        []Other:  []w/ice   [x]w/heat  Position: sitting   Location: cerv/UT    [] Estim: []Att    []TENS instruct  []NMES                    []Other:  []w/US   []w/ice   []w/heat  Position:  Location:    []  Traction: [] Cervical       []Lumbar                       [] Prone          []Supine                       []Intermittent   []Continuous Lbs:  [] before manual  [] after manual    []  Ultrasound: []Continuous   [] Pulsed                           []1MHz   []3MHz W/cm2:  Location:    []  Iontophoresis with dexamethasone         Location: [] Take home patch   [] In clinic    []  Ice     []  heat  []  Ice massage  []  Laser   []  Anodyne Position:  Location:    []  Laser with stim  []  Other:  Position:  Location:    []  Vasopneumatic Device Pressure:       [] lo [] med [] hi   Temperature: [] lo [] med [] hi   [] Skin assessment post-treatment: []intact []redness- no adverse reaction    []redness - adverse reaction:       25 min Therapeutic Exercise:  [x] See flow sheet :   Rationale: increase ROM and increase strength to improve the patients ability to perform daily task with ease. 15 min Neuromuscular Re-education:  [x]  See flow sheet :   Rationale: increase strength, improve coordination and increase proprioception  to improve the patients ability to ADL's. 10 min Manual Therapy:  gentle STM to bilateral UT and rhomboids seated, supine SOR   The manual therapy interventions were performed at a separate and distinct time from the therapeutic activities interventions. Rationale: decrease pain, increase ROM, increase tissue extensibility and decrease trigger points to improve functional mobility with ADL's. With   [] TE   [] TA   [] neuro   [] other: Patient Education: [x] Review HEP    [] Progressed/Changed HEP based on:   [] positioning   [] body mechanics   [] transfers   [] heat/ice application    [] other:      Other Objective/Functional Measures:      Pain Level (0-10 scale) post treatment: 0    ASSESSMENT/Changes in Function:   Pt able to tolerate deeper palpation at the C/T junction and along her incision this visit during manual reporting decreased tension following. Cervical exercises progressed to AROM with pt reporting some discomfort with c/s retraction however able to complete the exercise as prescribed. Thread the needle and open book added to continue to to improve cervical and thoracic mobility. Pt reports no pain and decreased tension post treatment.     Patient will continue to benefit from skilled PT services to modify and progress therapeutic interventions, address functional mobility deficits, address ROM deficits, address strength deficits, analyze and address soft tissue restrictions, analyze and cue movement patterns, analyze and modify body mechanics/ergonomics and assess and modify postural abnormalities to attain remaining goals. [x]  See Plan of Care  []  See progress note/recertification  []  See Discharge Summary         Progress towards goals / Updated goals:  Updated Goals: to be achieved in 4 weeks:  1. Pt will improve FOTO to 47, demonstrating improved functional capacity for ADLs.             Re-cert: progressing, obtain measure at next visit. 2. Pt will improve B scapular stability MMT strength to 4+/5 or better to improve ease of functional activities.             Re-cert: remains, grossly 4/5 B    3. Pt will improve cervical ROM in all planes to 40 degrees or better without pain increase to improve ease of work tasks and driving.              AO-IYGP: [id number], 32 degrees ext, 20 degrees ext,  40 degrees rotation B, 30 degrees sidebending \"straining\"               Current: 12/31/2020 - cervical flexion 25 degrees, cervical extension 18 degrees, right rotation 50 degrees, left rotation 40 degrees, right sidebend 22 degrees, left sidebend 14 degrees   4. Pt will report 50% improvement in overall symptoms, demonstrating improved QOL.                Re-cert: slow progress, reports has not seen a lot of change so far and doesn't know if she needs to change her pillow                Current: no change 1/4/20 Pt reports no changes with increased pain at night and heaviness in the c/s.     PLAN  []  Upgrade activities as tolerated     [x]  Continue plan of care  []  Update interventions per flow sheet       []  Discharge due to:_  []  Other:_      Florence Rosado, PTA 1/11/2021  6:00 PM    Future Appointments   Date Time Provider Bandar Jacobsen   1/13/2021  6:00 PM Jean Marie GUAJARDO

## 2021-01-13 ENCOUNTER — HOSPITAL ENCOUNTER (OUTPATIENT)
Dept: PHYSICAL THERAPY | Age: 66
Discharge: HOME | End: 2021-01-13
Payer: COMMERCIAL

## 2021-01-13 PROCEDURE — 97112 NEUROMUSCULAR REEDUCATION: CPT

## 2021-01-13 PROCEDURE — 97140 MANUAL THERAPY 1/> REGIONS: CPT

## 2021-01-13 PROCEDURE — 97110 THERAPEUTIC EXERCISES: CPT

## 2021-01-13 NOTE — PROGRESS NOTES
PT DAILY TREATMENT NOTE     Patient Name: Myriam Hansen  Date:2021  : 1955  [x]  Patient  Verified  Payor: BLUE CROSS / Plan: [facility] PPO / Product Type: PPO /    In time: 600 Out time:700  Total Treatment Time (min): 60  Visit #: 6 of 8    Medicare/BCBS Only   Total Timed Codes (min):  50 1:1 Treatment Time:  50       Treatment Area: Neck pain [M54.2]  Spinal stenosis, cervical region [M48.02]    SUBJECTIVE  Pain Level (0-10 scale): 0 \"pressure\"   Any medication changes, allergies to medications, adverse drug reactions, diagnosis change, or new procedure performed?: [x] No    [] Yes (see summary sheet for update)  Subjective functional status/changes:   [] No changes reported  Patient reports she has no pain today but continues to have the \"pressure/tightness\" in the cervical/upper thoracic region.      OBJECTIVE    Modality rationale: decrease pain and increase tissue extensibility to improve the patients ability to perform functional tasks with greater ease    Min Type Additional Details   10 [x] Estim:  [x]Unatt       []IFC  []Premod                        []Other:  []w/ice   [x]w/heat  Position: sitting   Location: cervical/upper trap    [] Estim: []Att    []TENS instruct  []NMES                    []Other:  []w/US   []w/ice   []w/heat  Position:  Location:    []  Traction: [] Cervical       []Lumbar                       [] Prone          []Supine                       []Intermittent   []Continuous Lbs:  [] before manual  [] after manual    []  Ultrasound: []Continuous   [] Pulsed                           []1MHz   []3MHz W/cm2:  Location:    []  Iontophoresis with dexamethasone         Location: [] Take home patch   [] In clinic    []  Ice     []  heat  []  Ice massage  []  Laser   []  Anodyne Position:  Location:    []  Laser with stim  []  Other:  Position:  Location:    []  Vasopneumatic Device Pressure:       [] lo [] med [] hi   Temperature: [] lo [] med [] hi [] Skin assessment post-treatment:  []intact []redness- no adverse reaction    []redness - adverse reaction:     32 min Therapeutic Exercise:  [x] See flow sheet :   Rationale: increase ROM, increase strength and improve coordination to improve the patients ability to perform ADLs and self care tasks with greater ease     8 min Neuromuscular Re-education:  [x]  See flow sheet: scapular stabilization exercises    Rationale: increase strength, improve coordination and increase proprioception  to improve the patients ability to perform functional tasks with improved stability     10 min Manual Therapy:  Seated - STM to bilateral UT, levator scap, and rhomboids    The manual therapy interventions were performed at a separate and distinct time from the therapeutic activities interventions. Rationale: decrease pain and increase tissue extensibility to perform functional tasks with greater ease             With   [] TE   [] TA   [] neuro   [] other: Patient Education: [x] Review HEP    [] Progressed/Changed HEP based on:   [] positioning   [] body mechanics   [] transfers   [] heat/ice application    [] other:      Other Objective/Functional Measure: right rhomboid strength 4/5, left rhomboids strength 4-/5     Pain Level (0-10 scale) post treatment: 0, \"less pressure\"     ASSESSMENT/Changes in Function: Patient presents for last visit under current plan of care. She reports minimal change since start of therapy in relation to her \"pressure\" she feels in the cervical/thoracic region. She demonstrates slight improvements in cervical AROM but no change in scapular strength, sleep tolerance, and improved functional mobility as measured by the FOTO score. She reports slight decrease in pressure following therapy session. []  See Plan of Care  []  See progress note/recertification  [x]  See Discharge Summary         Progress towards goals / Updated goals:  Updated Goals: to be achieved in 4 weeks:  1.  Pt will improve FOTO to 52, demonstrating improved functional capacity for ADLs.             Re-cert: progressing, obtain measure at next visit. Current: Progressing 1/13/2021 - 41  2. Pt will improve B scapular stability MMT strength to 4+/5 or better to improve ease of functional activities.             Re-cert: remains, grossly 4/5 B     Current: No change 1/13/2021 - right rhomboid strength 4/5, left rhomboids strength 4-/5   3. Pt will improve cervical ROM in all planes to 40 degrees or better without pain increase to improve ease of work tasks and driving.              IG-LCPM: [id number], 53 degrees ext, 20 degrees ext,  40 degrees rotation B, 30 degrees sidebending \"straining\"               Current: 12/31/2020 - cervical flexion 25 degrees, cervical extension 18 degrees, right rotation 50 degrees, left rotation 40 degrees, right sidebend 22 degrees, left sidebend 14 degrees   4. Pt will report 50% improvement in overall symptoms, demonstrating improved QOL.                Re-cert: slow progress, reports has not seen a lot of change so far and doesn't know if she needs to change her pillow                Current: no change 1/4/20 Pt reports no changes with increased pain at night and heaviness in the c/s. PLAN  []  Upgrade activities as tolerated     []  Continue plan of care  []  Update interventions per flow sheet       [x]  Discharge due to: minimal progress with therapy   []  Other:_      Jil Fonseca, PT, DPT 1/13/2021  6:13 PM    No future appointments.

## 2021-01-13 NOTE — PROGRESS NOTES
[facility]  [address]  [place], [address]  [phone] fax    Physical Therapy Discharge Summary  Patient name: Concetta Thomas Start of Care: 2020   Referral source: Dennis Sampson MD : 1955   Medical/Treatment Diagnosis: Neck pain [M54.2]  Spinal stenosis, cervical region [M48.02]  Payor: Veronika Aiken / Plan: [employer] PPO / Product Type: PPO /  Onset Date:cervical fusion 3/27/2020     Prior Hospitalization: see medical history Provider#: [id number]   Medications: Verified on Patient Summary List    Comorbidities: cervical fusion C4-6, arthritis, back pain, high blood pressure, osteoporosis  Prior Level of Function:manages ADLs and self care tasks with neck pain and B UE radiculopathy  Visits from Start of Care: 12    Missed Visits: 3  Reporting Period : 2020 to 2021      Summary of Care:  Updated Goals: to be achieved in 4 weeks:  1. Pt will improve FOTO to 47, demonstrating improved functional capacity for ADLs.             Re-cert: progressing, obtain measure at next visit. Current: Progressing - 41  2. Pt will improve B scapular stability MMT strength to 4+/5 or better to improve ease of functional activities.             Re-cert: remains, grossly 4/5 B     Current: No change - right rhomboid strength 4/5, left rhomboids strength 4-/5   3. Pt will improve cervical ROM in all planes to 40 degrees or better without pain increase to improve ease of work tasks and driving.              WX-PQMB: [id number], 81 degrees ext, 20 degrees ext,  40 degrees rotation B, 30 degrees sidebending \"straining\"               Current: cervical flexion 25 degrees, cervical extension 18 degrees, right rotation 50 degrees, left rotation 40 degrees, right sidebend 22 degrees, left sidebend 14 degrees   4. Pt will report 50% improvement in overall symptoms, demonstrating improved QOL.                Re-cert: slow progress, reports has not seen a lot of change so far and doesn't know if she needs to change her pillow                Current: no change - Pt reports no changes with increased pain at night and heaviness in the c/s. ASSESSMENT/RECOMMENDATIONS: Patient presents for last visit under current plan of care. She reports minimal change since start of therapy in relation to her \"pressure\" she feels in the cervical/thoracic region. She demonstrates slight improvements in cervical AROM but no change in scapular strength, sleep tolerance, and improved functional mobility as measured by the FOTO score. Patient encouraged to follow up with MD at end of therapy. Patient provided updated HEP to maintain gains made during therapy bout.  Thank you for this referral.       [x]Discontinue therapy: []Patient has reached or is progressing toward set goals      []Patient is non-compliant or has abdicated      [x]Due to lack of appreciable progress towards set goals    Arnel Parker, PT, DPT 1/13/2021 6:29 PM

## 2021-01-13 NOTE — PROGRESS NOTES
Physical Therapy Discharge Instructions      [facility]  [address]  [place], [address]  [phone] fax    Patient: Uli July  : 1955      Continue Home Exercise Program 1 times per day for 4 weeks, then decrease to 4 times per week      Continue with    [] Ice  as needed 2-3 times per day     [x] Heat           Follow up with MD:     [x] Upon completion of therapy     [] As needed      Recommendations:     [x]   Return to activity with home program    []   Return to activity with the following modifications:       []Post Rehab Program    []Join Independent aquatic program     []Return to/join local gym            Ginny Riedel, PT, DPT 2021 6:29 PM

## 2021-02-15 NOTE — PROGRESS NOTES
[facility]  [address]  [place], [address]  [phone] fax    Physical Therapy Discharge Summary  Patient name: Oliver Thomas Start of Care: 2020   Referral source: Arelia Nyhan, MD : 1955   Medical/Treatment Diagnosis: Balance problem [R26.89]  Payor: BLUE CROSS / Plan: [employer] PPO / Product Type: PPO /  Onset Date:2020     Prior Hospitalization: see medical history Provider#: [id number]   Medications: Verified on Patient Summary List    Comorbidities: s/p C4-C5 decompression and fusion 3/27/2020  Prior Level of Function:independent  Visits from Start of Care: 1    Missed Visits: 1  Reporting Period : 2020 to 2020      Summary of Care:  Goal:  Patient will improve single leg stance on left to 25 seconds to promote stability with stair navigation  Status at last note/certification:  19 sec  Status at discharge: not met    Goal:  Patient will improve sharpened Romberg eyes closed to 30 sec to promote balance in dark spaces. Status at last note/certification: 7 sec eyes closed  Status at discharge: not met    Goal:  Patient will improve dynamic gait index from  to  in order to reduce risk of falls. Status at last note/certification:   Status at discharge: not met      ASSESSMENT/RECOMMENDATIONS: Patient seen for evaluation only for balance deficit. Patient self discharged, per EMR. Per note in EMR, patient stated that she \"doesn't think PT is right\" for her. Please re-consult PT if balance deficits continue.    [x]Discontinue therapy: []Patient has reached or is progressing toward set goals      [x]Patient is non-compliant or has abdicated      []Due to lack of appreciable progress towards set [facility] [address], PT, DPT, CLT  2/15/2021 9:30 AM

## 2021-08-30 NOTE — PROGRESS NOTES
[facility]  [address]  [place], [address]  [phone] fax    Plan of Care/ Statement of Necessity for Physical Therapy Services    Patient name: Dale Rodriguez Start of Care: 2020   Referral source: Minnie Antoine MD : 1955    Medical Diagnosis: Neck pain [M54.2]  Spinal stenosis, cervical region [M48.02]  Payor: BLUE CROSS / Plan: [facility] PPO / Product Type: PPO /  Onset Date:cervical fusion 3/27/2020    Treatment Diagnosis: neck and thoracic pain   Prior Hospitalization: see medical history Provider#: [id number]   Medications: Verified on Patient summary List    Comorbidities: cervical fusion C4-6, arthritis, back pain, high blood pressure, osteoporosis   Prior Level of Function: manages ADLs and self care tasks with neck pain and B UE radiculopathy     The Plan of Care and following information is based on the information from the initial evaluation. Assessment/ key information: Pt is a 50-year-old female presenting to the clinic today with c/o neck and upper thoracic pain described as \"spasms\" and \"feels like a big weight on my shoulders. \" Occasional burning pains are located at the thoracic spine and into the B scapulae just below C7. Pt had a posterior approach cervical fusion of 2020 in which she wore a cervical collar for 3 months and a soft collar for 2 months. She previously underwent a fusion with an anterior approach in  and reports currently she is fused from C4-6. Her MD advises her to wear the soft collar intermittently throughout the day to reduce pain. Pt reports she may have had a case of left UE drop shoulder following this past surgery. 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  Current impairments and functional limitations include decreased cervical ROM, decreased flexibility of the neck, decreased scapular strength, impaired ease of ADLs and work tasks, and increased muscular tenderness and tightness of the levator scapulae, scalenes, and rhomboids. Limited segmental mobility noted C7-T5 with very reactive pain to gentle palpation and massage. Patient will benefit from skilled PT services to modify and progress therapeutic interventions, address functional mobility deficits, address ROM deficits, address strength deficits, analyze and address soft tissue restrictions, analyze and cue movement patterns, analyze and modify body mechanics/ergonomics, assess and modify postural abnormalities and instruct in home and community integration to attain remaining goals. Evaluation Complexity History MEDIUM  Complexity : 1-2 comorbidities / personal factors will impact the outcome/ POC ; Examination MEDIUM Complexity : 3 Standardized tests and measures addressing body structure, function, activity limitation and / or participation in recreation  ;Presentation MEDIUM Complexity : Evolving with changing characteristics  ; Clinical Decision Making MEDIUM Complexity : FOTO score of 26-74  Overall Complexity Rating: MEDIUM  Problem List: pain affecting function, decrease ROM, decrease strength, decrease ADL/ functional abilitiies, decrease activity tolerance and decrease flexibility/ joint mobility   Treatment Plan may include any combination of the following: Therapeutic exercise, Therapeutic activities, Neuromuscular re-education, Physical agent/modality, Manual therapy, Patient education, Self Care training and Functional mobility training  Patient / Family readiness to learn indicated by: asking questions, trying to perform skills and interest  Persons(s) to be included in education: patient (P)  Barriers to Learning/Limitations: None  Patient Goal (s): healing  Patient Self Reported Health Status: good  Rehabilitation Potential: good    Short Term Goals: To be accomplished in 2 weeks:  1.  Pt [Follow - Up] : a follow-up visit will report daily compliance with HEP at least once a day to maximize therapeutic success. 2. Pt will report no increase in pain following therapy to improve ease and comfort of self care. Long Term Goals: To be accomplished in 4 weeks:  1. Pt will improve FOTO to 47, demonstrating improved functional capacity for ADLs. 2. Pt will improve B scapular stability MMT strength to 4+/5 or better to improve ease of functional activities. 3. Pt will improve cervical ROM in all planes to 40 degrees or better without pain increase to improve ease of work tasks and driving. 4. Pt will report 50% improvement in overall symptoms, demonstrating improved QOL. Frequency / Duration: Patient to be seen 2 times per week for 4 weeks. Patient/ Caregiver education and instruction: Diagnosis, prognosis, self care, activity modification and exercises   [x]  Plan of care has been reviewed with ROVERTO Allen, PT 11/18/2020 7:08 PM    ________________________________________________________________________    I certify that the above Therapy Services are being furnished while the patient is under my care. I agree with the treatment plan and certify that this therapy is necessary.     Physician's Signature:____________Date:_________TIME:________    ** Signature, Date and Time must be completed for valid certification **    Please sign and return to [facility] [address] [place] [address] & [employer] [facility]  [address] [facility] [address]  [place], [address]  [phone] fax [DM Type 2] : DM Type 2

## 2022-08-23 ENCOUNTER — HOSPITAL ENCOUNTER (OUTPATIENT)
Dept: PHYSICAL THERAPY | Age: 67
Discharge: HOME | End: 2022-08-23
Payer: MEDICARE

## 2022-08-23 PROCEDURE — 97530 THERAPEUTIC ACTIVITIES: CPT

## 2022-08-23 PROCEDURE — 97110 THERAPEUTIC EXERCISES: CPT

## 2022-08-23 PROCEDURE — 97161 PT EVAL LOW COMPLEX 20 MIN: CPT
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
## 2022-08-24 ENCOUNTER — HOSPITAL ENCOUNTER (OUTPATIENT)
Dept: PHYSICAL THERAPY | Age: 67
Discharge: HOME | End: 2022-08-24
Payer: MEDICARE

## 2022-08-24 PROCEDURE — 97112 NEUROMUSCULAR REEDUCATION: CPT

## 2022-08-24 PROCEDURE — 97110 THERAPEUTIC EXERCISES: CPT

## 2022-08-24 PROCEDURE — 97140 MANUAL THERAPY 1/> REGIONS: CPT

## 2022-08-24 NOTE — PROGRESS NOTES
PT DAILY TREATMENT NOTE     Patient Name: Olvin Mckinney  Date:2022  : 1955  [x]  Patient  Verified  Payor: MEDICARE RAILROAD / Plan: [facility] MEDIC[facility] RAILROAD / Product Type: Medicare /    In time:8:16  Out time:9:05  Total Treatment Time (min): 49  Visit #: 2 of 8    Medicare/BCBS Only   Total Timed Codes (min):  39 1:1 Treatment Time:  39       Treatment Area: Cervicalgia [M54.2]    SUBJECTIVE  Pain Level (0-10 scale): 6-7  Any medication changes, allergies to medications, adverse drug reactions, diagnosis change, or new procedure performed?: [x] No    [] Yes (see summary sheet for update)  Subjective functional status/changes:   [] No changes reported  Pt reports the pain in her neck is about a 6-7/10 today. OBJECTIVE    Modality rationale: decrease pain and increase tissue extensibility to improve the patients ability to tolerate post workout soreness.    Min Type Additional Details    [] Estim:  []Unatt       []IFC  []Premod                        []Other:  []w/ice   []w/heat  Position:  Location:    [] Estim: []Att    []TENS instruct  []NMES                    []Other:  []w/US   []w/ice   []w/heat  Position:  Location:    []  Traction: [] Cervical       []Lumbar                       [] Prone          []Supine                       []Intermittent   []Continuous Lbs:  [] before manual  [] after manual    []  Ultrasound: []Continuous   [] Pulsed                           []1MHz   []3MHz W/cm2:  Location:    []  Iontophoresis with dexamethasone         Location: [] Take home patch   [] In clinic   10 []  Ice     [x]  heat  []  Ice massage  []  Laser   []  Anodyne Position:seated  Location: C/S    []  Laser with stim  []  Other:  Position:  Location:    []  Vasopneumatic Device    []  Right     []  Left  Pre-treatment girth:  Post-treatment girth:  Measured at (location):  Pressure:       [] lo [] med [] hi   Temperature: [] lo [] med [] hi   [] Skin assessment post-treatment: []intact []redness- no adverse reaction    []redness - adverse reaction:         23 min Therapeutic Exercise:  [x] See flow sheet :   Rationale: increase ROM and increase strength to improve the patients ability to perform functional task with ease. 8 min Neuromuscular Re-education:  [x]  See flow sheet :   Rationale: increase strength, improve coordination, and increase proprioception  to improve the patients ability to perform ADL's with ease. 8 min Manual Therapy: Supine- STM to B c/s parasinals, SOR. The manual therapy interventions were performed at a separate and distinct time from the therapeutic activities interventions. Rationale: decrease pain, increase ROM, increase tissue extensibility, and decrease trigger points to improve functional mobility      With   [] TE   [] TA   [] neuro   [] other: Patient Education: [x] Review HEP    [] Progressed/Changed HEP based on:   [] positioning   [] body mechanics   [] transfers   [] heat/ice application    [] other:      Other Objective/Functional Measures: initiated therex per flow sheet. Pain Level (0-10 scale) post treatment: 6    ASSESSMENT/Changes in Function:   First follow up session with pt displaying a fair tolerance to therex and putting forth a good effort with exercises. Pt required several rest breaks throughout doorway stretches and wall angels noting increased tightness in the C/S and upper back with doorway stretches and increased symptoms into the left hand with wall angels. Increased tightness and tenderness in the B UT's, L/S and C/S (Left > right) noted during manual. Pt reports non compliance with HEP at this time but notes she will initiate this evening. Pt reports a minor decrease in tightness post treatment but no significant changes in pain. Pt left in no apparent distress.      Patient will continue to benefit from skilled PT services to modify and progress therapeutic interventions, address functional mobility deficits, address ROM deficits, address strength deficits, analyze and address soft tissue restrictions, analyze and cue movement patterns, analyze and modify body mechanics/ergonomics, and assess and modify postural abnormalities to attain remaining goals. [x]  See Plan of Care  []  See progress note/recertification  []  See Discharge Summary         Progress towards goals / Updated goals:  Short Term Goals: To be accomplished in 2 weeks:  1. Patient will report performance of home exercise program 4 of 7 days in the next week demonstrating compliance to therapy program and progress towards independent management of symptoms. Eval: HEP provided and instructed   Current: not met 8/24/22 pt reports she has not initiated her HEP yet. Long Term Goals: To be accomplished in 4 weeks:  1. Patient will increase left shoulder strength grossly to at least 4+/5 to improve ease with daily activities. Evla: flexion and abduction 4-/5, ER and IR 4/5   2. Patient will report no difficulty with sleep to promote return to PLOF. Eval: requires heat pack to sleep   3. Patient will report at least 75% improvement in overall symptoms to promote improved quality of life. Eval: 0%   4. Patient will increase cervical rotation bilaterally to at least 55* to improve ease with driving. Eval right rotation 45* with pain on right, left 42* with pain on right   5. Patient will increase FOTO score to at least 52 to improve ease with daily activities.                Eval: 35    PLAN  []  Upgrade activities as tolerated     [x]  Continue plan of care  []  Update interventions per flow sheet       []  Discharge due to:_  []  Other:_      Lucila Cardenas PTA 8/24/2022  8:20 AM    Future Appointments   Date Time Provider Bandar Jacobsen   8/29/2022 12:45 PM Maggie Gutierrez PTA [facility]   8/31/2022  8:15 AM [facility]   9/7/2022 12:00 PM Maggie Gutierrez PTA [facility]   9/9/2022 12:45 PM Yandel Haywood PTA [facility]

## 2022-08-29 ENCOUNTER — TELEPHONE (OUTPATIENT)
Dept: PHYSICAL THERAPY | Age: 67
End: 2022-08-29

## 2022-08-29 ENCOUNTER — APPOINTMENT (OUTPATIENT)
Dept: PHYSICAL THERAPY | Age: 67
End: 2022-08-29
Payer: MEDICARE

## 2022-08-31 ENCOUNTER — HOSPITAL ENCOUNTER (OUTPATIENT)
Dept: PHYSICAL THERAPY | Age: 67
Discharge: HOME | End: 2022-08-31
Payer: MEDICARE

## 2022-08-31 PROCEDURE — 97140 MANUAL THERAPY 1/> REGIONS: CPT

## 2022-08-31 PROCEDURE — 97112 NEUROMUSCULAR REEDUCATION: CPT

## 2022-08-31 PROCEDURE — 97110 THERAPEUTIC EXERCISES: CPT

## 2022-08-31 NOTE — PROGRESS NOTES
PT DAILY TREATMENT NOTE     Patient Name: Sony Given  Date:2022  : 1955  [x]  Patient  Verified  Payor: [facility] MEDICARE / Plan: [address] / Product Type: Managed Care Medicare /    In time:8:15  Out time:9:101  Total Treatment Time (min): 55  Visit #: 3 of 8    Medicare/BCBS Only   Total Timed Codes (min):  45 1:1 Treatment Time:  45       Treatment Area: Cervicalgia [M54.2]    SUBJECTIVE  Pain Level (0-10 scale): 7  Any medication changes, allergies to medications, adverse drug reactions, diagnosis change, or new procedure performed?: [x] No    [] Yes (see summary sheet for update)  Subjective functional status/changes:   [] No changes reported  Pt reports she has a lot of stiffness in her upper back and left side of her neck. OBJECTIVE    Modality rationale: decrease pain and increase tissue extensibility to improve the patients ability to perform ADL's with ease.    Min Type Additional Details    [] Estim:  []Unatt       []IFC  []Premod                        []Other:  []w/ice   []w/heat  Position:  Location:    [] Estim: []Att    []TENS instruct  []NMES                    []Other:  []w/US   []w/ice   []w/heat  Position:  Location:    []  Traction: [] Cervical       []Lumbar                       [] Prone          []Supine                       []Intermittent   []Continuous Lbs:  [] before manual  [] after manual    []  Ultrasound: []Continuous   [] Pulsed                           []1MHz   []3MHz W/cm2:  Location:    []  Iontophoresis with dexamethasone         Location: [] Take home patch   [] In clinic   10 []  Ice     []  heat  []  Ice massage  []  Laser   []  Anodyne Position:  Location:    []  Laser with stim  []  Other:  Position:  Location:    []  Vasopneumatic Device    []  Right     []  Left  Pre-treatment girth:  Post-treatment girth:  Measured at (location):  Pressure:       [] lo [] med [] hi   Temperature: [] lo [] med [] hi   [] Skin assessment post-treatment:  []intact []redness- no adverse reaction    []redness - adverse reaction:         27 min Therapeutic Exercise:  [x] See flow sheet :   Rationale: increase ROM and increase strength to improve the patients ability to perform functional task with ease. 8 min Neuromuscular Re-education:  [x]  See flow sheet :   Rationale: increase strength, improve coordination, and increase proprioception  to improve the patients ability to perform ADL's with ease. 10 min Manual Therapy:  Prone- STM to T/S, UT/LS. Supine- STM to B c/s parasinals, SOR. The manual therapy interventions were performed at a separate and distinct time from the therapeutic activities interventions. Rationale: decrease pain, increase ROM, increase tissue extensibility, and decrease trigger points to improve functional mobility with ADL's. With   [] TE   [] TA   [] neuro   [] other: Patient Education: [x] Review HEP    [] Progressed/Changed HEP based on:   [] positioning   [] body mechanics   [] transfers   [] heat/ice application    [] other:      Other Objective/Functional Measures: HEP reported as initiated. Openbooks- 10x3\" ea    Trial ultra sound NV per script and pt request.    Pain Level (0-10 scale) post treatment: 5    ASSESSMENT/Changes in Function:   Difficulty with performance of wall angels with chin tuck at the wall with pt displaying limited UE mobility and reporting increased T/S and C/S pain. Wall angels modified to being performed in supine with Jeremy Ulloa with pt noting less discomfort and displaying minor improvements in mobility. Cueing required to decrease B UT shrugging throughout therex with pt also encouraged to monitor while outside of clinic. Open books initiated to continue to improve C/S and T/S mobility with pt noting a minor increase in tightness in the left c/s with performance but able to complete as prescribed.  Continued treatment to improve C/S and left shoulder mobility and to decrease mm restrictions to aid with ease of ADL's. Patient will continue to benefit from skilled PT services to modify and progress therapeutic interventions, address functional mobility deficits, address ROM deficits, address strength deficits, analyze and address soft tissue restrictions, analyze and cue movement patterns, analyze and modify body mechanics/ergonomics, and assess and modify postural abnormalities to attain remaining goals. [x]  See Plan of Care  []  See progress note/recertification  []  See Discharge Summary         Progress towards goals / Updated goals:  Short Term Goals: To be accomplished in 2 weeks:  1. Patient will report performance of home exercise program 4 of 7 days in the next week demonstrating compliance to therapy program and progress towards independent management of symptoms. Eval: HEP provided and instructed   Current: not met 8/24/22 pt reports she has not initiated her HEP yet. Long Term Goals: To be accomplished in 4 weeks:  1. Patient will increase left shoulder strength grossly to at least 4+/5 to improve ease with daily activities. Evla: flexion and abduction 4-/5, ER and IR 4/5   2. Patient will report no difficulty with sleep to promote return to PLOF. Eval: requires heat pack to sleep   3. Patient will report at least 75% improvement in overall symptoms to promote improved quality of life. Eval: 0%   4. Patient will increase cervical rotation bilaterally to at least 55* to improve ease with driving. Eval right rotation 45* with pain on right, left 42* with pain on right   5. Patient will increase FOTO score to at least 52 to improve ease with daily activities.                Eval: 35       PLAN  []  Upgrade activities as tolerated     [x]  Continue plan of care  []  Update interventions per flow sheet       []  Discharge due to:_  []  Other:_      Nyla Allen ROVERTO 8/31/2022  8:17 AM    Future Appointments   Date Time Provider Bandar Jacobsen   9/7/2022 12:00 PM ROVERTO [facility]   9/9/2022 12:45 PM ROVERTO [facility]

## 2022-09-07 ENCOUNTER — APPOINTMENT (OUTPATIENT)
Dept: PHYSICAL THERAPY | Age: 67
End: 2022-09-07
Payer: MEDICARE

## 2022-09-09 ENCOUNTER — APPOINTMENT (OUTPATIENT)
Dept: PHYSICAL THERAPY | Age: 67
End: 2022-09-09
Payer: MEDICARE

## 2022-09-14 ENCOUNTER — HOSPITAL ENCOUNTER (OUTPATIENT)
Dept: PHYSICAL THERAPY | Age: 67
Discharge: HOME | End: 2022-09-14
Payer: MEDICARE

## 2022-09-14 PROCEDURE — 97035 APP MDLTY 1+ULTRASOUND EA 15: CPT

## 2022-09-14 PROCEDURE — 97112 NEUROMUSCULAR REEDUCATION: CPT

## 2022-09-14 PROCEDURE — 97110 THERAPEUTIC EXERCISES: CPT

## 2022-09-14 PROCEDURE — 97140 MANUAL THERAPY 1/> REGIONS: CPT

## 2022-09-14 NOTE — PROGRESS NOTES
PT DAILY TREATMENT NOTE     Patient Name: Linn Landau  Date:2022  : 1955  [x]  Patient  Verified  Payor: [employer] / Plan: Meghan Mccullough / Product Type: Medicare /    In time:815  Out time:900  Total Treatment Time (min): 45  Visit #: 4 of 8    Medicare/BCBS Only   Total Timed Codes (min):  45 1:1 Treatment Time:  45       Treatment Area: Cervicalgia [M54.2]    SUBJECTIVE  Pain Level (0-10 scale): 7  Any medication changes, allergies to medications, adverse drug reactions, diagnosis change, or new procedure performed?: [x] No    [] Yes (see summary sheet for update)  Subjective functional status/changes:   [] No changes reported  Patient reports that she felt \"ok\" after her last visit. She states that she continues to experience a \"lot of stiffness and lack of motion\". OBJECTIVE    Modality rationale: decrease pain and increase tissue extensibility to improve the patients ability to tolerate AdLs post therapy.     Min Type Additional Details    [] Estim:  []Unatt       []IFC  []Premod                        []Other:  []w/ice   []w/heat  Position:  Location:    [] Estim: []Att    []TENS instruct  []NMES                    []Other:  []w/US   []w/ice   []w/heat  Position:  Location:    []  Traction: [] Cervical       []Lumbar                       [] Prone          []Supine                       []Intermittent   []Continuous Lbs:  [] before manual  [] after manual   8 [x]  Ultrasound: [x]Continuous   [] Pulsed                           [x]1MHz   []3MHz W/cm2: 1.2   Location: B UT/LS    []  Iontophoresis with dexamethasone         Location: [] Take home patch   [] In clinic    []  Ice     []  heat  []  Ice massage  []  Laser   []  Anodyne Position:  Location:    []  Laser with stim  []  Other:  Position:  Location:    []  Vasopneumatic Device    []  Right     []  Left  Pre-treatment girth:  Post-treatment girth:  Measured at (location):  Pressure:       [] lo [] med [] hi   Temperature: [] lo [] med [] hi   [x] Skin assessment post-treatment:  [x]intact [x]redness- no adverse reaction    []redness - adverse reaction:         13 min Therapeutic Exercise:  [x] See flow sheet :   Rationale: increase ROM and increase strength to improve the patients ability to complete AdLs with ease. 16 min Neuromuscular Re-education:  [x]  See flow sheet : cervical and scapular re-education, including VC/Tc to increase core engagement , decrease UT compensation, and decrease forward head posture during TB exercises   Rationale: increase ROM, increase strength, improve coordination, and increase proprioception  to improve the patients ability to improve biomechanics and increase kinesthetic awareness for ease of ADL completion. 8 min Manual Therapy:  Stm to B UT/LS/C/s with patient supine   The manual therapy interventions were performed at a separate and distinct time from the therapeutic activities interventions. Rationale: decrease pain, increase ROM, increase tissue extensibility, and decrease trigger points to improve mobility for ease of ADL completion. With   [x] TE   [] TA   [] neuro   [] other: Patient Education: [x] Review HEP    [] Progressed/Changed HEP based on:   [] positioning   [] body mechanics   [] transfers   [] heat/ice application    [x] other: continue HEP. Patient educated on use of towel roll at L/S and within pillowcase for improved postural and cervical alignment. Other Objective/Functional Measures:   -increased resistance during rows, extensions, ER/IR    -increased load during SA presses    -increased reps during chest presses     -challenged with increased time under tension during p.  Ball rotations and tucks    Pain Level (0-10 scale) post treatment: 6    ASSESSMENT/Changes in Function: Today's session focused on gentle progressions of cervical and scapular strengthening, mobility, and kinesthetic awareness exercises as well as introduction of ultrasound. Patient tolerated increased repetitions, time under tension, and load well, reporting no adverse responses throughout treatment. She continues to exhibit increased forward head posture and UT compensations during activities. Patient will continue to benefit from skilled PT services to modify and progress therapeutic interventions, address functional mobility deficits, address ROM deficits, address strength deficits, analyze and address soft tissue restrictions, analyze and cue movement patterns, analyze and modify body mechanics/ergonomics, assess and modify postural abnormalities, and instruct in home and community integration to attain remaining goals. []  See Plan of Care  []  See progress note/recertification  []  See Discharge Summary         Progress towards goals / Updated goals:  Short Term Goals: To be accomplished in 2 weeks:  1. Patient will report performance of home exercise program 4 of 7 days in the next week demonstrating compliance to therapy program and progress towards independent management of symptoms. Eval: HEP provided and instructed   Current: not met 8/24/22 pt reports she has not initiated her HEP yet. Long Term Goals: To be accomplished in 4 weeks:  1. Patient will increase left shoulder strength grossly to at least 4+/5 to improve ease with daily activities. Evla: flexion and abduction 4-/5, ER and IR 4/5   2. Patient will report no difficulty with sleep to promote return to PLOF. Eval: requires heat pack to sleep   Current: patient continues to report sleep difficulties 9/14/22  3. Patient will report at least 75% improvement in overall symptoms to promote improved quality of life. Eval: 0%   4. Patient will increase cervical rotation bilaterally to at least 55* to improve ease with driving. Eval right rotation 45* with pain on right, left 42* with pain on right   5.  Patient will increase FOTO score to at least 52 to improve ease with daily activities.                Eval: 35    PLAN  [x]  Upgrade activities as tolerated     [x]  Continue plan of care  []  Update interventions per flow sheet       []  Discharge due to:_  []  Other:_      Nika Villarreal PTA 9/14/2022  8:01 AM    Future Appointments   Date Time Provider Bandar Jacobsen   9/14/2022  8:15 AM Derek Guerra PTA [id number]

## 2022-09-19 ENCOUNTER — HOSPITAL ENCOUNTER (OUTPATIENT)
Dept: PHYSICAL THERAPY | Age: 67
Discharge: HOME | End: 2022-09-19
Payer: MEDICARE

## 2022-09-19 PROCEDURE — 97112 NEUROMUSCULAR REEDUCATION: CPT

## 2022-09-19 PROCEDURE — 97110 THERAPEUTIC EXERCISES: CPT

## 2022-09-19 PROCEDURE — 97140 MANUAL THERAPY 1/> REGIONS: CPT

## 2022-09-19 NOTE — PROGRESS NOTES
PT DAILY TREATMENT NOTE     Patient Name: Yossi Bear  Date:2022  : 1955  [x]  Patient  Verified  Payor: [facility] MEDICARE / Plan: [address] / Product Type: Managed Care Medicare /    In time:5:49  Out time:6:43  Total Treatment Time (min): 47  Visit #: 5 of 8    Medicare/BCBS Only   Total Timed Codes (min):  44 1:1 Treatment Time:  38       Treatment Area: Cervicalgia [M54.2]    SUBJECTIVE  Pain Level (0-10 scale): 7  Any medication changes, allergies to medications, adverse drug reactions, diagnosis change, or new procedure performed?: [x] No    [] Yes (see summary sheet for update)  Subjective functional status/changes:   [] No changes reported  Pt reports she is feeling ok. OBJECTIVE    Modality rationale: decrease pain and increase tissue extensibility to improve the patients ability to perform ADL's.    Min Type Additional Details    [] Estim:  []Unatt       []IFC  []Premod                        []Other:  []w/ice   []w/heat  Position:  Location:    [] Estim: []Att    []TENS instruct  []NMES                    []Other:  []w/US   []w/ice   []w/heat  Position:  Location:    []  Traction: [] Cervical       []Lumbar                       [] Prone          []Supine                       []Intermittent   []Continuous Lbs:  [] before manual  [] after manual    []  Ultrasound: []Continuous   [] Pulsed                           []1MHz   []3MHz W/cm2:  Location:    []  Iontophoresis with dexamethasone         Location: [] Take home patch   [] In clinic   10 []  Ice     [x]  heat  []  Ice massage  []  Laser   []  Anodyne Position:seated  Location:c/s    []  Laser with stim  []  Other:  Position:  Location:    []  Vasopneumatic Device    []  Right     []  Left  Pre-treatment girth:  Post-treatment girth:  Measured at (location):  Pressure:       [] lo [] med [] hi   Temperature: [] lo [] med [] hi   [x] Skin assessment post-treatment:  [x]intact []redness- no adverse reaction    []redness - adverse reaction:         12 min Therapeutic Exercise:  [x] See flow sheet :   Rationale: increase ROM and increase strength to improve the patients ability to perform functional task with ease. 24 min Neuromuscular Re-education:  [x]  See flow sheet :   Rationale: increase strength, improve coordination, and increase proprioception  to improve the patients ability to perform ADL's with ease. 8 min Manual Therapy:  Stm to B UT/LS/C/s with patient supine   The manual therapy interventions were performed at a separate and distinct time from the therapeutic activities interventions. Rationale: decrease pain, increase ROM, and increase tissue extensibility to improve functional mobility with ADL's. With   [] TE   [] TA   [] neuro   [] other: Patient Education: [x] Review HEP    [] Progressed/Changed HEP based on:   [] positioning   [] body mechanics   [] transfers   [] heat/ice application    [] other:      Other Objective/Functional Measures:      Pain Level (0-10 scale) post treatment: 5    ASSESSMENT/Changes in Function:   The pt has displayed little improvement in the C/S since [facility] noting no significant changes in pain and continued limitations in mobility. The pt has missed several appointments within her first bout of therapy and has shown intermittent consistency with her HEP at home. The pt does display improved left shoulder strength noted with testing however displays regressions in B cervical rotation. Regressions also noted in FOTO scoring. The pt was discharged to refer back to her MD due to lack of progress. Pt was agreeable. Decreased pain post treatment.     Patient will continue to benefit from skilled PT servicmodify and progress therapeutic interventions, address functional mobility deficits, address ROM deficits, address strength deficits, analyze and address soft tissue restrictions, analyze and cue movement patterns, analyze and modify body mechanics/ergonomics, and assess and modify postural abnormalitieses to  to attain remaining goals. [x]  See Plan of Care  []  See progress note/recertification  []  See Discharge Summary         Progress towards goals / Updated goals:  Short Term Goals: To be accomplished in 2 weeks:  1. Patient will report performance of home exercise program 4 of 7 days in the next week demonstrating compliance to therapy program and progress towards independent management of symptoms. Eval: HEP provided and instructed   Current: not met 8/24/22 pt reports she has not initiated her HEP yet. Long Term Goals: To be accomplished in 4 weeks:  1. Patient will increase left shoulder strength grossly to at least 4+/5 to improve ease with daily activities. Evla: flexion and abduction 4-/5, ER and IR 4/5   Current: progressing 9/19/22 left shoulder MMT grossly 4/5  2. Patient will report no difficulty with sleep to promote return to PLOF. Eval: requires heat pack to sleep   Current: patient continues to report sleep difficulties 9/14/22  3. Patient will report at least 75% improvement in overall symptoms to promote improved quality of life. Eval: 0%   Current: progressing 9/19/22 45% improvement. 4. Patient will increase cervical rotation bilaterally to at least 55* to improve ease with driving. Eval right rotation 45* with pain on right, left 42* with pain on right   Current: regressing 9/19/22 36 deg to the right, 30 deg to the left with a strain felt on both sides  5. Patient will increase FOTO score to at least 52 to improve ease with daily activities.                Eval: 35  Current: regressing 9/19/22 FOTO score 34    PLAN  []  Upgrade activities as tolerated     [x]  Continue plan of care  []  Update interventions per flow sheet       []  Discharge due to:_  []  Other:_      Ashlie Urias PTA 9/19/2022  5:55 PM    Future Appointments   Date Time Provider Bandar Jacobsen   9/19/2022  6:00 PM Johnathan Plascencia, PTA [id number]

## 2022-09-19 NOTE — PROGRESS NOTES
[facility]  [address] [facility] [address]  [place], [address]  [phone] fax    Physical Therapy Discharge Summary  Patient name: Willie Riddle Start of Care: 2022   Referral source: Garth Starks MD : 1955   Medical/Treatment Diagnosis: Cervicalgia [M54.2]  Payor: Nkechi Adkins / Plan: [employer] / Product Type: Managed Care Medicare /  Onset Date::2022                 Prior Hospitalization: see medical history Provider#: [id number]   Medications: Verified on Patient Summary List     Comorbidities: back pain, GI disease, HTN, prior cervical surgery 2001   Prior Level of Function: right hand dominant, no limitations with daily activities, dental assist   Visits from Start of Care: 5    Missed Visits: 3  Reporting Period : 2022 to 2022      Progress towards goals / Updated goals:  Short Term Goals:   1. Patient will report performance of home exercise program 4 of 7 days in the next week demonstrating compliance to therapy program and progress towards independent management of symptoms. DC: not met  pt reports she has not initiated her HEP yet. Long Term Goals:   1. Patient will increase left shoulder strength grossly to at least 4+/5 to improve ease with daily activities. DC: progressing  left shoulder MMT grossly 4/5  2. Patient will report no difficulty with sleep to promote return to PLOF. DC: patient continues to report sleep difficulties   3. Patient will report at least 75% improvement in overall symptoms to promote improved quality of life. DC: progressing  45% improvement. 4. Patient will increase cervical rotation bilaterally to at least 55* to improve ease with driving. DC: regressing 36 deg to the right, 30 deg to the left with a strain felt on both sides  5.  Patient will increase FOTO score to at least 52 to improve ease with daily activities. DC: regressing FOTO score 34      ASSESSMENT/RECOMMENDATIONS:  The pt has displayed little improvement in the C/S since [facility] noting no significant changes in pain and continued limitations in mobility. The pt has missed several appointments within her first bout of therapy and has shown intermittent consistency with her HEP at home. The pt does display improved left shoulder strength noted with testing however displays regressions in B cervical rotation. Regressions also noted in FOTO scoring. The pt was discharged to refer back to her MD due to lack of progress. Pt was agreeable. Decreased pain post treatment.     [x]Discontinue therapy: []Patient has reached or is progressing toward set goals      []Patient is non-compliant or has abdicated      [x]Due to lack of appreciable progress towards set Tyler Gunn PTA 9/19/2022 6:56 PM